# Patient Record
Sex: FEMALE | Race: OTHER | HISPANIC OR LATINO | Employment: UNEMPLOYED | URBAN - METROPOLITAN AREA
[De-identification: names, ages, dates, MRNs, and addresses within clinical notes are randomized per-mention and may not be internally consistent; named-entity substitution may affect disease eponyms.]

---

## 2022-07-12 ENCOUNTER — OFFICE VISIT (OUTPATIENT)
Dept: OBSTETRICS AND GYNECOLOGY | Facility: CLINIC | Age: 22
End: 2022-07-12
Payer: MEDICAID

## 2022-07-12 VITALS
SYSTOLIC BLOOD PRESSURE: 118 MMHG | HEIGHT: 60 IN | BODY MASS INDEX: 34.54 KG/M2 | DIASTOLIC BLOOD PRESSURE: 72 MMHG | WEIGHT: 175.94 LBS

## 2022-07-12 DIAGNOSIS — O09.32 LATE PRENATAL CARE AFFECTING PREGNANCY IN SECOND TRIMESTER: ICD-10-CM

## 2022-07-12 DIAGNOSIS — Z32.01 POSITIVE PREGNANCY TEST: ICD-10-CM

## 2022-07-12 DIAGNOSIS — O26.842 UTERINE SIZE-DATE DISCREPANCY IN SECOND TRIMESTER: ICD-10-CM

## 2022-07-12 DIAGNOSIS — N91.2 AMENORRHEA: Primary | ICD-10-CM

## 2022-07-12 PROCEDURE — 1159F MED LIST DOCD IN RCRD: CPT | Mod: CPTII,,,

## 2022-07-12 PROCEDURE — 1159F PR MEDICATION LIST DOCUMENTED IN MEDICAL RECORD: ICD-10-PCS | Mod: CPTII,,,

## 2022-07-12 PROCEDURE — 99203 PR OFFICE/OUTPT VISIT, NEW, LEVL III, 30-44 MIN: ICD-10-PCS | Mod: S$PBB,TH,,

## 2022-07-12 PROCEDURE — 99999 PR PBB SHADOW E&M-NEW PATIENT-LVL II: CPT | Mod: PBBFAC,,,

## 2022-07-12 PROCEDURE — 3008F BODY MASS INDEX DOCD: CPT | Mod: CPTII,,,

## 2022-07-12 PROCEDURE — 3074F SYST BP LT 130 MM HG: CPT | Mod: CPTII,,,

## 2022-07-12 PROCEDURE — 99202 OFFICE O/P NEW SF 15 MIN: CPT | Mod: PBBFAC,TH

## 2022-07-12 PROCEDURE — 3078F DIAST BP <80 MM HG: CPT | Mod: CPTII,,,

## 2022-07-12 PROCEDURE — 87077 CULTURE AEROBIC IDENTIFY: CPT

## 2022-07-12 PROCEDURE — 3074F PR MOST RECENT SYSTOLIC BLOOD PRESSURE < 130 MM HG: ICD-10-PCS | Mod: CPTII,,,

## 2022-07-12 PROCEDURE — 3078F PR MOST RECENT DIASTOLIC BLOOD PRESSURE < 80 MM HG: ICD-10-PCS | Mod: CPTII,,,

## 2022-07-12 PROCEDURE — 87186 SC STD MICRODIL/AGAR DIL: CPT

## 2022-07-12 PROCEDURE — 99203 OFFICE O/P NEW LOW 30 MIN: CPT | Mod: S$PBB,TH,,

## 2022-07-12 PROCEDURE — 3008F PR BODY MASS INDEX (BMI) DOCUMENTED: ICD-10-PCS | Mod: CPTII,,,

## 2022-07-12 PROCEDURE — 87088 URINE BACTERIA CULTURE: CPT

## 2022-07-12 PROCEDURE — 87086 URINE CULTURE/COLONY COUNT: CPT

## 2022-07-12 PROCEDURE — 99999 PR PBB SHADOW E&M-NEW PATIENT-LVL II: ICD-10-PCS | Mod: PBBFAC,,,

## 2022-07-12 NOTE — PROGRESS NOTES
"CHIEF COMPLAINT:   Patient presents with      Possible Pregnancy        HISTORY OF PRESENT ILLNESS  Arabella Farias 21 y.o.  presents for pregnancy risk assessment.   The patient has no complaints today.  No nausea or vomiting. No bleeding or pain.  Pregnancy was not planned, and is desired.  Partner "Tony" is involved with the pregnancy.  Here today with self and child.  Lives at home with mother and son.  Pets are not in the home. Patient is not currently working.  Denies domestic abuse.  Denies chemical/pesticide/radiation exposure.  OB history:   2020: , term, boy, no complicaitons    LMP: Patient's last menstrual period was 2022.  EDC: Estimated Date of Delivery: 22  EGA: 21w3d  FHT: 152       Health Maintenance   Topic Date Due    Hepatitis C Screening  Never done    Lipid Panel  Never done    HPV Vaccines (1 - 2-dose series) Never done    TETANUS VACCINE  Never done    Pap Smear  Never done       History reviewed. No pertinent past medical history.    History reviewed. No pertinent surgical history.    History reviewed. No pertinent family history.    Social History     Socioeconomic History    Marital status: Single   Tobacco Use    Smoking status: Never Smoker    Smokeless tobacco: Never Used   Substance and Sexual Activity    Alcohol use: Not Currently    Drug use: Not Currently    Sexual activity: Not Currently     Partners: Male       No current outpatient medications on file.     No current facility-administered medications for this visit.       Review of patient's allergies indicates:  No Known Allergies      PHYSICAL EXAM   Vitals:    22 1347   BP: 118/72        PAIN SCALE: 0-1    PHYSICAL EXAM    ROS:  GENERAL: No fever, chills, fatigability or weight loss.  CV: Denies chest pain  PULM: Denies shortness of breath or wheezing.  ABDOMEN: Appetite fine. No weight loss. Denies diarrhea, abdominal pain, hematemesis or blood in stool.  URINARY: No flank " pain, dysuria or hematuria.  REPRODUCTIVE: No abnormal vaginal bleeding.       PE:   APPEARANCE: Well nourished, well developed, in no acute distress  ABDOMEN: Soft. No tenderness or masses.     UPT +    A/P:     Amenorrhea  -     POCT urine pregnancy    Positive pregnancy test  -     HIV 1/2 Ag/Ab (4th Gen); Future; Expected date: 07/12/2022  -     RPR; Future; Expected date: 07/12/2022  -     Type & Screen; Future; Expected date: 07/12/2022  -     Rubella antibody, IgG; Future; Expected date: 07/12/2022  -     Urine culture  -     CBC auto differential; Future; Expected date: 07/12/2022  -     US OB/GYN Procedure ([facility]); Future; Expected date: 07/19/2022  -     Hepatitis Panel, Acute; Future; Expected date: 07/12/2022  -     C. trachomatis/N. gonorrhoeae by AMP DNA [place]; Urine  -     Hemoglobin Electrophoresis,Hgb A2 Lei.; Future; Expected date: 07/12/2022    Uterine size-date discrepancy in second trimester  -     US OB/GYN Procedure ([facility]); Future; Expected date: 07/19/2022    Late prenatal care affecting pregnancy in second trimester           -      Patient was counseled today on A.C.S. Pap guidelines and recommendations for yearly pelvic exams, mammograms and monthly self breast exams; to see her PCP for other health maintenance and pregnancy.   - Pap is not up to date. Pap was not done. Will do pap PP  -      Patient's medications and medical history reviewed with patient and implications in pregnancy.   Late prenatal care  -     Follow-up initial OB and u/s.   -     Initial labs today

## 2022-07-14 ENCOUNTER — TELEPHONE (OUTPATIENT)
Dept: OBSTETRICS AND GYNECOLOGY | Facility: CLINIC | Age: 22
End: 2022-07-14
Payer: MEDICAID

## 2022-07-14 LAB — BACTERIA UR CULT: ABNORMAL

## 2022-07-14 NOTE — TELEPHONE ENCOUNTER
Called lab and they said gc/ct was canceled because the quantity was insufficient and that pt will need to repeat.

## 2022-07-14 NOTE — TELEPHONE ENCOUNTER
----- Message from Cayla See sent at 7/13/2022  3:08 AM CDT -----  Regarding: Lab Client Services  Contact: [phone]  Good Morning,     My name is Cayla See I work in the Lab Client Services. We had a problem with some lab work on this patient. If someone from your office could call us at [phone] or [phone] that would be great. Anyone in my department can help.      Thank you

## 2022-07-19 ENCOUNTER — TELEPHONE (OUTPATIENT)
Dept: OBSTETRICS AND GYNECOLOGY | Facility: HOSPITAL | Age: 22
End: 2022-07-19
Payer: MEDICAID

## 2022-07-19 DIAGNOSIS — O23.40 URINARY TRACT INFECTION IN MOTHER DURING PREGNANCY, ANTEPARTUM: Primary | ICD-10-CM

## 2022-07-19 RX ORDER — NITROFURANTOIN 25; 75 MG/1; MG/1
100 CAPSULE ORAL 2 TIMES DAILY
Qty: 14 CAPSULE | Refills: 0 | Status: SHIPPED | OUTPATIENT
Start: 2022-07-19 | End: 2022-07-26

## 2022-07-21 ENCOUNTER — LAB VISIT (OUTPATIENT)
Dept: LAB | Facility: HOSPITAL | Age: 22
End: 2022-07-21
Payer: MEDICAID

## 2022-07-21 ENCOUNTER — PROCEDURE VISIT (OUTPATIENT)
Dept: OBSTETRICS AND GYNECOLOGY | Facility: CLINIC | Age: 22
End: 2022-07-21
Payer: MEDICAID

## 2022-07-21 ENCOUNTER — INITIAL PRENATAL (OUTPATIENT)
Dept: OBSTETRICS AND GYNECOLOGY | Facility: CLINIC | Age: 22
End: 2022-07-21
Payer: MEDICAID

## 2022-07-21 VITALS
DIASTOLIC BLOOD PRESSURE: 50 MMHG | SYSTOLIC BLOOD PRESSURE: 100 MMHG | WEIGHT: 174.81 LBS | BODY MASS INDEX: 34.14 KG/M2

## 2022-07-21 DIAGNOSIS — O09.32 LATE PRENATAL CARE AFFECTING PREGNANCY IN SECOND TRIMESTER: Primary | ICD-10-CM

## 2022-07-21 DIAGNOSIS — Z32.01 POSITIVE PREGNANCY TEST: ICD-10-CM

## 2022-07-21 DIAGNOSIS — Z36.2 ENCOUNTER FOR FOLLOW-UP ULTRASOUND OF FETAL ANATOMY: ICD-10-CM

## 2022-07-21 DIAGNOSIS — O26.842 UTERINE SIZE-DATE DISCREPANCY IN SECOND TRIMESTER: ICD-10-CM

## 2022-07-21 LAB
BASOPHILS # BLD AUTO: 0.03 K/UL (ref 0–0.2)
BASOPHILS NFR BLD: 0.2 % (ref 0–1.9)
DIFFERENTIAL METHOD: ABNORMAL
EOSINOPHIL # BLD AUTO: 0.2 K/UL (ref 0–0.5)
EOSINOPHIL NFR BLD: 1.6 % (ref 0–8)
ERYTHROCYTE [DISTWIDTH] IN BLOOD BY AUTOMATED COUNT: 14.9 % (ref 11.5–14.5)
HCT VFR BLD AUTO: 32 % (ref 37–48.5)
HGB BLD-MCNC: 10 G/DL (ref 12–16)
IMM GRANULOCYTES # BLD AUTO: 0.06 K/UL (ref 0–0.04)
IMM GRANULOCYTES NFR BLD AUTO: 0.5 % (ref 0–0.5)
LYMPHOCYTES # BLD AUTO: 2.4 K/UL (ref 1–4.8)
LYMPHOCYTES NFR BLD: 18.4 % (ref 18–48)
MCH RBC QN AUTO: 26.7 PG (ref 27–31)
MCHC RBC AUTO-ENTMCNC: 31.3 G/DL (ref 32–36)
MCV RBC AUTO: 85 FL (ref 82–98)
MONOCYTES # BLD AUTO: 0.6 K/UL (ref 0.3–1)
MONOCYTES NFR BLD: 4.4 % (ref 4–15)
NEUTROPHILS # BLD AUTO: 9.7 K/UL (ref 1.8–7.7)
NEUTROPHILS NFR BLD: 74.9 % (ref 38–73)
NRBC BLD-RTO: 0 /100 WBC
PLATELET # BLD AUTO: 269 K/UL (ref 150–450)
PMV BLD AUTO: 11.4 FL (ref 9.2–12.9)
RBC # BLD AUTO: 3.75 M/UL (ref 4–5.4)
WBC # BLD AUTO: 12.97 K/UL (ref 3.9–12.7)

## 2022-07-21 PROCEDURE — 36415 COLL VENOUS BLD VENIPUNCTURE: CPT

## 2022-07-21 PROCEDURE — 99999 PR PBB SHADOW E&M-EST. PATIENT-LVL III: ICD-10-PCS | Mod: PBBFAC,,, | Performed by: ADVANCED PRACTICE MIDWIFE

## 2022-07-21 PROCEDURE — 86592 SYPHILIS TEST NON-TREP QUAL: CPT

## 2022-07-21 PROCEDURE — 86850 RBC ANTIBODY SCREEN: CPT

## 2022-07-21 PROCEDURE — 87389 HIV-1 AG W/HIV-1&-2 AB AG IA: CPT

## 2022-07-21 PROCEDURE — 99999 PR PBB SHADOW E&M-EST. PATIENT-LVL III: CPT | Mod: PBBFAC,,, | Performed by: ADVANCED PRACTICE MIDWIFE

## 2022-07-21 PROCEDURE — 99213 OFFICE O/P EST LOW 20 MIN: CPT | Mod: PBBFAC,TH | Performed by: ADVANCED PRACTICE MIDWIFE

## 2022-07-21 PROCEDURE — 99213 PR OFFICE/OUTPT VISIT, EST, LEVL III, 20-29 MIN: ICD-10-PCS | Mod: TH,S$PBB,, | Performed by: ADVANCED PRACTICE MIDWIFE

## 2022-07-21 PROCEDURE — 76805 US OB/GYN PROCEDURE (VIEWPOINT): ICD-10-PCS | Mod: 26,S$PBB,, | Performed by: OBSTETRICS & GYNECOLOGY

## 2022-07-21 PROCEDURE — 80074 ACUTE HEPATITIS PANEL: CPT

## 2022-07-21 PROCEDURE — 99213 OFFICE O/P EST LOW 20 MIN: CPT | Mod: TH,S$PBB,, | Performed by: ADVANCED PRACTICE MIDWIFE

## 2022-07-21 PROCEDURE — 85025 COMPLETE CBC W/AUTO DIFF WBC: CPT

## 2022-07-21 PROCEDURE — 76805 OB US >/= 14 WKS SNGL FETUS: CPT | Mod: PBBFAC | Performed by: OBSTETRICS & GYNECOLOGY

## 2022-07-21 PROCEDURE — 83020 HEMOGLOBIN ELECTROPHORESIS: CPT

## 2022-07-21 PROCEDURE — 86762 RUBELLA ANTIBODY: CPT

## 2022-07-21 NOTE — PROGRESS NOTES
21 y.o. female  at 23w1d   Reports + FM, denies VB, LOF, or cramping  Doing well without concerns. Did not do labs after her last visit, will do them today.   TW lbs   US today shows single IUP with SANCHEZ of . Anatomy NOT complete, cephalic presentation, anterior placenta, 3VC, normal IRAIS, EFW 1#5oz. Repeat US nv.   Does not want to do genprobe today. Will do nv.   Reviewed upcoming 28wk labs, (unsure of blood type, will do labs today) and orders placed, tdap handout provided and explained  Reviewed warning signs, normal FM,  labor precautions and how/when to call.  RTC x 4 wks, call or present sooner prn.     I spent a total of 20 minutes on the day of the visit.This includes face to face time and non-face to face time preparing to see the patient (eg, review of tests), Obtaining and/or reviewing separately obtained history, Documenting clinical information in the electronic or other health record, Independently interpreting resultsand communicating results to the patient/family/caregiver, or Care coordination.

## 2022-07-22 LAB
ABO + RH BLD: NORMAL
BLD GP AB SCN CELLS X3 SERPL QL: NORMAL
HGB A2 MFR BLD HPLC: 2.7 % (ref 2.2–3.2)
HGB FRACT BLD ELPH-IMP: NORMAL
HGB FRACT BLD ELPH-IMP: NORMAL
HIV 1+2 AB+HIV1 P24 AG SERPL QL IA: NEGATIVE
RPR SER QL: NORMAL
RUBV IGG SER-ACNC: 13.7 IU/ML
RUBV IGG SER-IMP: REACTIVE

## 2022-07-25 DIAGNOSIS — R76.8 HEPATITIS C ANTIBODY TEST POSITIVE: Primary | ICD-10-CM

## 2022-07-25 LAB
HAV IGM SERPL QL IA: NEGATIVE
HBV CORE IGM SERPL QL IA: NEGATIVE
HBV SURFACE AG SERPL QL IA: NEGATIVE
HCV AB SERPL QL IA: POSITIVE

## 2022-08-18 ENCOUNTER — ROUTINE PRENATAL (OUTPATIENT)
Dept: OBSTETRICS AND GYNECOLOGY | Facility: CLINIC | Age: 22
End: 2022-08-18
Payer: MEDICAID

## 2022-08-18 ENCOUNTER — PROCEDURE VISIT (OUTPATIENT)
Dept: OBSTETRICS AND GYNECOLOGY | Facility: CLINIC | Age: 22
End: 2022-08-18
Payer: MEDICAID

## 2022-08-18 ENCOUNTER — LAB VISIT (OUTPATIENT)
Dept: LAB | Facility: HOSPITAL | Age: 22
End: 2022-08-18
Attending: ADVANCED PRACTICE MIDWIFE
Payer: MEDICAID

## 2022-08-18 VITALS
WEIGHT: 176.81 LBS | SYSTOLIC BLOOD PRESSURE: 102 MMHG | BODY MASS INDEX: 34.53 KG/M2 | DIASTOLIC BLOOD PRESSURE: 60 MMHG

## 2022-08-18 DIAGNOSIS — O09.32 LATE PRENATAL CARE AFFECTING PREGNANCY IN SECOND TRIMESTER: ICD-10-CM

## 2022-08-18 DIAGNOSIS — Z3A.27 27 WEEKS GESTATION OF PREGNANCY: ICD-10-CM

## 2022-08-18 DIAGNOSIS — R76.8 HEPATITIS C ANTIBODY POSITIVE IN BLOOD: ICD-10-CM

## 2022-08-18 DIAGNOSIS — O23.40 URINARY TRACT INFECTION IN MOTHER DURING PREGNANCY, ANTEPARTUM: ICD-10-CM

## 2022-08-18 DIAGNOSIS — Z36.2 ENCOUNTER FOR FOLLOW-UP ULTRASOUND OF FETAL ANATOMY: ICD-10-CM

## 2022-08-18 DIAGNOSIS — O09.32 LATE PRENATAL CARE AFFECTING PREGNANCY IN SECOND TRIMESTER: Primary | ICD-10-CM

## 2022-08-18 DIAGNOSIS — R76.8 HEPATITIS C ANTIBODY TEST POSITIVE: ICD-10-CM

## 2022-08-18 LAB — GLUCOSE SERPL-MCNC: 109 MG/DL (ref 70–140)

## 2022-08-18 PROCEDURE — 87591 N.GONORRHOEAE DNA AMP PROB: CPT | Performed by: MIDWIFE

## 2022-08-18 PROCEDURE — 99213 OFFICE O/P EST LOW 20 MIN: CPT | Mod: TH,S$PBB,25, | Performed by: MIDWIFE

## 2022-08-18 PROCEDURE — 99212 OFFICE O/P EST SF 10 MIN: CPT | Mod: PBBFAC,TH | Performed by: MIDWIFE

## 2022-08-18 PROCEDURE — 82950 GLUCOSE TEST: CPT | Performed by: ADVANCED PRACTICE MIDWIFE

## 2022-08-18 PROCEDURE — 36415 COLL VENOUS BLD VENIPUNCTURE: CPT | Performed by: ADVANCED PRACTICE MIDWIFE

## 2022-08-18 PROCEDURE — 87086 URINE CULTURE/COLONY COUNT: CPT | Performed by: MIDWIFE

## 2022-08-18 PROCEDURE — 76816 OB US FOLLOW-UP PER FETUS: CPT | Mod: PBBFAC | Performed by: OBSTETRICS & GYNECOLOGY

## 2022-08-18 PROCEDURE — 99213 PR OFFICE/OUTPT VISIT, EST, LEVL III, 20-29 MIN: ICD-10-PCS | Mod: TH,S$PBB,25, | Performed by: MIDWIFE

## 2022-08-18 PROCEDURE — 76816 US OB/GYN PROCEDURE (VIEWPOINT): ICD-10-PCS | Mod: 26,S$PBB,, | Performed by: OBSTETRICS & GYNECOLOGY

## 2022-08-18 PROCEDURE — 87491 CHLMYD TRACH DNA AMP PROBE: CPT | Performed by: MIDWIFE

## 2022-08-18 PROCEDURE — 99999 PR PBB SHADOW E&M-EST. PATIENT-LVL II: CPT | Mod: PBBFAC,,, | Performed by: MIDWIFE

## 2022-08-18 PROCEDURE — 99999 PR PBB SHADOW E&M-EST. PATIENT-LVL II: ICD-10-PCS | Mod: PBBFAC,,, | Performed by: MIDWIFE

## 2022-08-18 PROCEDURE — 87522 HEPATITIS C REVRS TRNSCRPJ: CPT

## 2022-08-18 NOTE — PROGRESS NOTES
21 y.o. female  at 27w1d   Reports + FM, denies VB, LOF, or cramping  Doing well without concerns,  used for visit, discussed diagnosis of positive hepatitis C antibody, pt denies IV drug use or a blood transfusion, will do more labs today to determine viral load if any, glucose screen today as well  TW lbs   TDAP info given  US today for sub opt anatomy, all anatomy normal and complete, good interval growth  Reviewed upcoming 28wk labs, (O POS) and orders placed, tdap handout provided and explained  Reviewed warning signs, normal FM,  labor precautions and how/when to call.  RTC x 2 wks, call or present sooner prn.     I spent a total of 20 minutes on the day of the visit.This includes face to face time and non-face to face time preparing to see the patient (eg, review of tests), Obtaining and/or reviewing separately obtained history, Documenting clinical information in the electronic or other health record, Independently interpreting resultsand communicating results to the patient/family/caregiver, or Care coordination.

## 2022-08-20 LAB
BACTERIA UR CULT: NO GROWTH
HCV RNA SERPL NAA+PROBE-ACNC: NORMAL IU/ML

## 2022-08-21 LAB
C TRACH DNA SPEC QL NAA+PROBE: NOT DETECTED
N GONORRHOEA DNA SPEC QL NAA+PROBE: NOT DETECTED

## 2022-09-02 ENCOUNTER — ROUTINE PRENATAL (OUTPATIENT)
Dept: OBSTETRICS AND GYNECOLOGY | Facility: CLINIC | Age: 22
End: 2022-09-02
Payer: MEDICAID

## 2022-09-02 VITALS — DIASTOLIC BLOOD PRESSURE: 72 MMHG | SYSTOLIC BLOOD PRESSURE: 124 MMHG | WEIGHT: 177 LBS | BODY MASS INDEX: 34.57 KG/M2

## 2022-09-02 DIAGNOSIS — Z23 NEED FOR TDAP VACCINATION: Primary | ICD-10-CM

## 2022-09-02 DIAGNOSIS — O99.013 ANEMIA IN PREGNANCY, THIRD TRIMESTER: ICD-10-CM

## 2022-09-02 PROCEDURE — 99999 PR PBB SHADOW E&M-EST. PATIENT-LVL I: ICD-10-PCS | Mod: PBBFAC,,, | Performed by: ADVANCED PRACTICE MIDWIFE

## 2022-09-02 PROCEDURE — 99213 OFFICE O/P EST LOW 20 MIN: CPT | Mod: TH,S$PBB,, | Performed by: ADVANCED PRACTICE MIDWIFE

## 2022-09-02 PROCEDURE — 99999 PR PBB SHADOW E&M-EST. PATIENT-LVL I: CPT | Mod: PBBFAC,,, | Performed by: ADVANCED PRACTICE MIDWIFE

## 2022-09-02 PROCEDURE — 99213 PR OFFICE/OUTPT VISIT, EST, LEVL III, 20-29 MIN: ICD-10-PCS | Mod: TH,S$PBB,, | Performed by: ADVANCED PRACTICE MIDWIFE

## 2022-09-02 PROCEDURE — 90715 TDAP VACCINE 7 YRS/> IM: CPT | Mod: PBBFAC

## 2022-09-02 PROCEDURE — 99211 OFF/OP EST MAY X REQ PHY/QHP: CPT | Mod: PBBFAC,TH | Performed by: ADVANCED PRACTICE MIDWIFE

## 2022-09-02 RX ORDER — FERROUS SULFATE 324(65)MG
324 TABLET, DELAYED RELEASE (ENTERIC COATED) ORAL DAILY
Qty: 30 TABLET | Refills: 5 | Status: SHIPPED | OUTPATIENT
Start: 2022-09-02 | End: 2022-10-18

## 2022-09-02 NOTE — PROGRESS NOTES
21 y.o. female  at 29w2d   Reports + FM, denies VB, LOF or CTX  Doing well without concerns     TW lbs   Reviewed 28wk lab results (O POS)  Anemia-sending iron RX today  Passed GTT  Tdap today    Reviewed warning signs, normal FKCs,  labor precautions and how/when to call.  RTC x 2 wks, call or present sooner prn.     I spent a total of 20 minutes on the day of the visit.This includes face to face time and non-face to face time preparing to see the patient (eg, review of tests), Obtaining and/or reviewing separately obtained history, Documenting clinical information in the electronic or other health record, Independently interpreting resultsand communicating results to the patient/family/caregiver, or Care coordination.

## 2022-09-16 ENCOUNTER — ROUTINE PRENATAL (OUTPATIENT)
Dept: OBSTETRICS AND GYNECOLOGY | Facility: CLINIC | Age: 22
End: 2022-09-16
Payer: MEDICAID

## 2022-09-16 VITALS
SYSTOLIC BLOOD PRESSURE: 118 MMHG | WEIGHT: 175.06 LBS | DIASTOLIC BLOOD PRESSURE: 70 MMHG | BODY MASS INDEX: 34.19 KG/M2

## 2022-09-16 DIAGNOSIS — O23.40 URINARY TRACT INFECTION IN MOTHER DURING PREGNANCY, ANTEPARTUM: ICD-10-CM

## 2022-09-16 DIAGNOSIS — Z3A.31 31 WEEKS GESTATION OF PREGNANCY: Primary | ICD-10-CM

## 2022-09-16 PROCEDURE — 99213 OFFICE O/P EST LOW 20 MIN: CPT | Mod: TH,S$PBB,,

## 2022-09-16 PROCEDURE — 99213 PR OFFICE/OUTPT VISIT, EST, LEVL III, 20-29 MIN: ICD-10-PCS | Mod: TH,S$PBB,,

## 2022-09-16 PROCEDURE — 99212 OFFICE O/P EST SF 10 MIN: CPT | Mod: PBBFAC,TH

## 2022-09-16 PROCEDURE — 99999 PR PBB SHADOW E&M-EST. PATIENT-LVL II: CPT | Mod: PBBFAC,,,

## 2022-09-16 PROCEDURE — 99999 PR PBB SHADOW E&M-EST. PATIENT-LVL II: ICD-10-PCS | Mod: PBBFAC,,,

## 2022-09-16 NOTE — PROGRESS NOTES
21 y.o. female  at 31w2d   Reports + FM, denies VB, LOF or CTX  Doing well without concerns   TWG: 10 lbs   Tdap 22  Reviewed warning signs, normal FKCs,  labor precautions and how/when to call. Patient states understanding.  RTC x 2 wks, call or present sooner prn.     I spent a total of 20 minutes on the day of the visit.This includes face to face time and non-face to face time preparing to see the patient (eg, review of tests), Obtaining and/or reviewing separately obtained history, Documenting clinical information in the electronic or other health record, Independently interpreting resultsand communicating results to the patient/family/caregiver, or Care coordination.

## 2022-09-21 ENCOUNTER — PATIENT MESSAGE (OUTPATIENT)
Dept: ADMINISTRATIVE | Facility: OTHER | Age: 22
End: 2022-09-21
Payer: MEDICAID

## 2022-10-03 ENCOUNTER — ROUTINE PRENATAL (OUTPATIENT)
Dept: OBSTETRICS AND GYNECOLOGY | Facility: CLINIC | Age: 22
End: 2022-10-03
Payer: MEDICAID

## 2022-10-03 VITALS
DIASTOLIC BLOOD PRESSURE: 74 MMHG | WEIGHT: 172.19 LBS | SYSTOLIC BLOOD PRESSURE: 120 MMHG | BODY MASS INDEX: 33.63 KG/M2

## 2022-10-03 DIAGNOSIS — Z36.89 ENCOUNTER FOR ULTRASOUND TO ASSESS FETAL GROWTH: Primary | ICD-10-CM

## 2022-10-03 DIAGNOSIS — O09.32 LATE PRENATAL CARE AFFECTING PREGNANCY IN SECOND TRIMESTER: ICD-10-CM

## 2022-10-03 PROCEDURE — 99212 OFFICE O/P EST SF 10 MIN: CPT | Mod: PBBFAC,TH | Performed by: ADVANCED PRACTICE MIDWIFE

## 2022-10-03 PROCEDURE — 99999 PR PBB SHADOW E&M-EST. PATIENT-LVL II: ICD-10-PCS | Mod: PBBFAC,,, | Performed by: ADVANCED PRACTICE MIDWIFE

## 2022-10-03 PROCEDURE — 99999 PR PBB SHADOW E&M-EST. PATIENT-LVL II: CPT | Mod: PBBFAC,,, | Performed by: ADVANCED PRACTICE MIDWIFE

## 2022-10-03 PROCEDURE — 99213 OFFICE O/P EST LOW 20 MIN: CPT | Mod: TH,S$PBB,, | Performed by: ADVANCED PRACTICE MIDWIFE

## 2022-10-03 PROCEDURE — 99213 PR OFFICE/OUTPT VISIT, EST, LEVL III, 20-29 MIN: ICD-10-PCS | Mod: TH,S$PBB,, | Performed by: ADVANCED PRACTICE MIDWIFE

## 2022-10-03 NOTE — PROGRESS NOTES
21 y.o. female  at 33w5d    [facility]  number [id number]  Reports + FM, denies VB, LOF or regular CTX  Doing well without concerns.  TW lbs   GBS handout provided and reviewed  Reviewed warning signs, normal FKCs, labor precautions and how/when to call.  RTC x 2 wks, call or present sooner prn.     I spent a total of 20 minutes on the day of the visit.This includes face to face time and non-face to face time preparing to see the patient (eg, review of tests), Obtaining and/or reviewing separately obtained history, Documenting clinical information in the electronic or other health record, Independently interpreting resultsand communicating results to the patient/family/caregiver, or Care coordination.

## 2022-10-18 ENCOUNTER — ROUTINE PRENATAL (OUTPATIENT)
Dept: OBSTETRICS AND GYNECOLOGY | Facility: CLINIC | Age: 22
End: 2022-10-18
Payer: MEDICAID

## 2022-10-18 ENCOUNTER — PROCEDURE VISIT (OUTPATIENT)
Dept: OBSTETRICS AND GYNECOLOGY | Facility: CLINIC | Age: 22
End: 2022-10-18
Payer: MEDICAID

## 2022-10-18 VITALS — BODY MASS INDEX: 33.54 KG/M2 | SYSTOLIC BLOOD PRESSURE: 98 MMHG | WEIGHT: 171.75 LBS | DIASTOLIC BLOOD PRESSURE: 74 MMHG

## 2022-10-18 DIAGNOSIS — Z36.89 ENCOUNTER FOR ULTRASOUND TO ASSESS FETAL GROWTH: ICD-10-CM

## 2022-10-18 DIAGNOSIS — O09.32 LATE PRENATAL CARE AFFECTING PREGNANCY IN SECOND TRIMESTER: Primary | ICD-10-CM

## 2022-10-18 PROCEDURE — 99212 OFFICE O/P EST SF 10 MIN: CPT | Mod: PBBFAC,TH | Performed by: ADVANCED PRACTICE MIDWIFE

## 2022-10-18 PROCEDURE — 76816 US OB/GYN PROCEDURE (VIEWPOINT): ICD-10-PCS | Mod: 26,S$PBB,, | Performed by: OBSTETRICS & GYNECOLOGY

## 2022-10-18 PROCEDURE — 99999 PR PBB SHADOW E&M-EST. PATIENT-LVL II: CPT | Mod: PBBFAC,,, | Performed by: ADVANCED PRACTICE MIDWIFE

## 2022-10-18 PROCEDURE — 99213 PR OFFICE/OUTPT VISIT, EST, LEVL III, 20-29 MIN: ICD-10-PCS | Mod: TH,S$PBB,, | Performed by: ADVANCED PRACTICE MIDWIFE

## 2022-10-18 PROCEDURE — 99999 PR PBB SHADOW E&M-EST. PATIENT-LVL II: ICD-10-PCS | Mod: PBBFAC,,, | Performed by: ADVANCED PRACTICE MIDWIFE

## 2022-10-18 PROCEDURE — 99213 OFFICE O/P EST LOW 20 MIN: CPT | Mod: TH,S$PBB,, | Performed by: ADVANCED PRACTICE MIDWIFE

## 2022-10-18 PROCEDURE — 76816 OB US FOLLOW-UP PER FETUS: CPT | Mod: PBBFAC | Performed by: OBSTETRICS & GYNECOLOGY

## 2022-10-18 NOTE — PROGRESS NOTES
21 y.o. female  at 35w6d  Reports + FM, denies VB, LOF or regular CTX  Doing well without concerns. Feeling more pressure over the last few days but not contractions. Encouraged to pack bags and get car seat ready.   TW lbs   GBS NV, 35.6 today.    US today shwos cephalic presentation, 3VC, anterior placenta, IRAIS 15.8, EFW 6#4oz (40%)  Reviewed warning signs, normal FKCs, labor precautions and how/when to call.  RTC x 1 wks, call or present sooner prn.     I spent a total of 20 minutes on the day of the visit.This includes face to face time and non-face to face time preparing to see the patient (eg, review of tests), Obtaining and/or reviewing separately obtained history, Documenting clinical information in the electronic or other health record, Independently interpreting resultsand communicating results to the patient/family/caregiver, or Care coordination.

## 2022-10-26 ENCOUNTER — ROUTINE PRENATAL (OUTPATIENT)
Dept: OBSTETRICS AND GYNECOLOGY | Facility: CLINIC | Age: 22
End: 2022-10-26
Payer: MEDICAID

## 2022-10-26 VITALS — SYSTOLIC BLOOD PRESSURE: 114 MMHG | BODY MASS INDEX: 33.88 KG/M2 | WEIGHT: 173.5 LBS | DIASTOLIC BLOOD PRESSURE: 70 MMHG

## 2022-10-26 DIAGNOSIS — Z3A.37 37 WEEKS GESTATION OF PREGNANCY: Primary | ICD-10-CM

## 2022-10-26 PROCEDURE — 87081 CULTURE SCREEN ONLY: CPT | Performed by: ADVANCED PRACTICE MIDWIFE

## 2022-10-26 PROCEDURE — 99213 PR OFFICE/OUTPT VISIT, EST, LEVL III, 20-29 MIN: ICD-10-PCS | Mod: TH,S$PBB,, | Performed by: ADVANCED PRACTICE MIDWIFE

## 2022-10-26 PROCEDURE — 90471 IMMUNIZATION ADMIN: CPT | Mod: PBBFAC

## 2022-10-26 PROCEDURE — 99999 PR PBB SHADOW E&M-EST. PATIENT-LVL II: ICD-10-PCS | Mod: PBBFAC,,, | Performed by: ADVANCED PRACTICE MIDWIFE

## 2022-10-26 PROCEDURE — 99212 OFFICE O/P EST SF 10 MIN: CPT | Mod: PBBFAC | Performed by: ADVANCED PRACTICE MIDWIFE

## 2022-10-26 PROCEDURE — 99213 OFFICE O/P EST LOW 20 MIN: CPT | Mod: TH,S$PBB,, | Performed by: ADVANCED PRACTICE MIDWIFE

## 2022-10-26 PROCEDURE — 99999 PR PBB SHADOW E&M-EST. PATIENT-LVL II: CPT | Mod: PBBFAC,,, | Performed by: ADVANCED PRACTICE MIDWIFE

## 2022-10-26 NOTE — PROGRESS NOTES
21 y.o. female  at 37w0d MARTTI used for interpretation   Reports + FM, denies VB, LOF or regular CTX  Doing well without concerns   TW lbs   GBS collected today   The skin of the suprapubic region was evaluated and appears intact without lesions.  Counseled the patient to shower daily and to wash this area with an antibacterial soap such as Dial daily.  Advised her to not shave the hair from this area from now until after delivery.  I also counseled the patient to place antibacterial hand soap in all her bathrooms and kitchen at home to help facilitate proper hand hygiene practices before and after delivery.   Flu Vaccine today  Written info in Tongan on how to tell when labor starts provided.  Reviewed warning signs, normal FKCs, labor precautions and how/when to call.  RTC x 1 wks, call or present sooner prn.     I spent a total of 15 minutes on the day of the visit.This includes face to face time and non-face to face time preparing to see the patient (eg, review of tests), Obtaining and/or reviewing separately obtained history, Documenting clinical information in the electronic or other health record, Independently interpreting resultsand communicating results to the patient/family/caregiver, or Care coordination.

## 2022-10-29 LAB — BACTERIA SPEC AEROBE CULT: NORMAL

## 2022-11-04 ENCOUNTER — ROUTINE PRENATAL (OUTPATIENT)
Dept: OBSTETRICS AND GYNECOLOGY | Facility: CLINIC | Age: 22
End: 2022-11-04
Payer: MEDICAID

## 2022-11-04 ENCOUNTER — PROCEDURE VISIT (OUTPATIENT)
Dept: OBSTETRICS AND GYNECOLOGY | Facility: CLINIC | Age: 22
End: 2022-11-04
Payer: MEDICAID

## 2022-11-04 VITALS
WEIGHT: 172.81 LBS | DIASTOLIC BLOOD PRESSURE: 74 MMHG | BODY MASS INDEX: 33.76 KG/M2 | SYSTOLIC BLOOD PRESSURE: 120 MMHG

## 2022-11-04 DIAGNOSIS — Z3A.38 38 WEEKS GESTATION OF PREGNANCY: ICD-10-CM

## 2022-11-04 DIAGNOSIS — O36.8190 DECREASED FETAL MOVEMENT DURING PREGNANCY, ANTEPARTUM, SINGLE OR UNSPECIFIED FETUS: Primary | ICD-10-CM

## 2022-11-04 DIAGNOSIS — O36.8190 DECREASED FETAL MOVEMENT DURING PREGNANCY, ANTEPARTUM, SINGLE OR UNSPECIFIED FETUS: ICD-10-CM

## 2022-11-04 PROCEDURE — 99213 OFFICE O/P EST LOW 20 MIN: CPT | Mod: TH,S$PBB,, | Performed by: ADVANCED PRACTICE MIDWIFE

## 2022-11-04 PROCEDURE — 76819 US OB/GYN PROCEDURE (VIEWPOINT): ICD-10-PCS | Mod: 26,S$PBB,, | Performed by: OBSTETRICS & GYNECOLOGY

## 2022-11-04 PROCEDURE — 76819 FETAL BIOPHYS PROFIL W/O NST: CPT | Mod: PBBFAC | Performed by: OBSTETRICS & GYNECOLOGY

## 2022-11-04 PROCEDURE — 99213 PR OFFICE/OUTPT VISIT, EST, LEVL III, 20-29 MIN: ICD-10-PCS | Mod: TH,S$PBB,, | Performed by: ADVANCED PRACTICE MIDWIFE

## 2022-11-04 PROCEDURE — 99999 PR PBB SHADOW E&M-EST. PATIENT-LVL II: ICD-10-PCS | Mod: PBBFAC,,, | Performed by: ADVANCED PRACTICE MIDWIFE

## 2022-11-04 PROCEDURE — 99212 OFFICE O/P EST SF 10 MIN: CPT | Mod: PBBFAC,TH,25 | Performed by: ADVANCED PRACTICE MIDWIFE

## 2022-11-04 PROCEDURE — 99999 PR PBB SHADOW E&M-EST. PATIENT-LVL II: CPT | Mod: PBBFAC,,, | Performed by: ADVANCED PRACTICE MIDWIFE

## 2022-11-04 NOTE — PROGRESS NOTES
JAIRO used for interpretation.  C/O decreased FM, normal FKC's reviewed.  BPP 8 of 8, VTX , MVP 6.0   Advised GBS negative.  S/S labor reviewed.  Warning S/S reviewed.  LND contact # provided.  RTC 1 week    Today I spent a total of 20 minutes with this patient. This includes face to face time and non-face to face time preparing to see the patient (eg, review of tests), obtaining and/or reviewing separately obtained history, documenting clinical information in the electronic or other health record, independently interpreting results and communicating results to the patient/family/caregiver, or care coordinator.

## 2022-11-05 ENCOUNTER — HOSPITAL ENCOUNTER (INPATIENT)
Facility: HOSPITAL | Age: 22
LOS: 2 days | Discharge: HOME | End: 2022-11-07
Attending: OBSTETRICS & GYNECOLOGY | Admitting: OBSTETRICS & GYNECOLOGY
Payer: MEDICAID

## 2022-11-05 DIAGNOSIS — Z37.9 NORMAL LABOR: ICD-10-CM

## 2022-11-05 PROBLEM — O09.32 LATE PRENATAL CARE AFFECTING PREGNANCY IN SECOND TRIMESTER: Status: RESOLVED | Noted: 2022-07-12 | Resolved: 2022-11-05

## 2022-11-05 PROBLEM — Z78.9 USES SPANISH AS PRIMARY SPOKEN LANGUAGE: Status: ACTIVE | Noted: 2022-11-05

## 2022-11-05 PROBLEM — Z3A.38 38 WEEKS GESTATION OF PREGNANCY: Status: RESOLVED | Noted: 2022-11-04 | Resolved: 2022-11-05

## 2022-11-05 PROBLEM — O23.40 URINARY TRACT INFECTION IN MOTHER DURING PREGNANCY, ANTEPARTUM: Status: RESOLVED | Noted: 2022-07-19 | Resolved: 2022-11-05

## 2022-11-05 PROBLEM — D64.9 ANEMIA: Status: ACTIVE | Noted: 2022-11-05

## 2022-11-05 LAB
ABO + RH BLD: NORMAL
BASOPHILS # BLD AUTO: 0.04 K/UL (ref 0–0.2)
BASOPHILS NFR BLD: 0.3 % (ref 0–1.9)
BLD GP AB SCN CELLS X3 SERPL QL: NORMAL
DIFFERENTIAL METHOD: ABNORMAL
EOSINOPHIL # BLD AUTO: 0.1 K/UL (ref 0–0.5)
EOSINOPHIL NFR BLD: 0.7 % (ref 0–8)
ERYTHROCYTE [DISTWIDTH] IN BLOOD BY AUTOMATED COUNT: 15.2 % (ref 11.5–14.5)
HCT VFR BLD AUTO: 33.6 % (ref 37–48.5)
HGB BLD-MCNC: 10.3 G/DL (ref 12–16)
IMM GRANULOCYTES # BLD AUTO: 0.05 K/UL (ref 0–0.04)
IMM GRANULOCYTES NFR BLD AUTO: 0.4 % (ref 0–0.5)
LYMPHOCYTES # BLD AUTO: 3 K/UL (ref 1–4.8)
LYMPHOCYTES NFR BLD: 22.9 % (ref 18–48)
MCH RBC QN AUTO: 23.4 PG (ref 27–31)
MCHC RBC AUTO-ENTMCNC: 30.7 G/DL (ref 32–36)
MCV RBC AUTO: 76 FL (ref 82–98)
MONOCYTES # BLD AUTO: 0.5 K/UL (ref 0.3–1)
MONOCYTES NFR BLD: 3.7 % (ref 4–15)
NEUTROPHILS # BLD AUTO: 9.5 K/UL (ref 1.8–7.7)
NEUTROPHILS NFR BLD: 72 % (ref 38–73)
NRBC BLD-RTO: 0 /100 WBC
PLATELET # BLD AUTO: 271 K/UL (ref 150–450)
PMV BLD AUTO: 11.4 FL (ref 9.2–12.9)
RBC # BLD AUTO: 4.41 M/UL (ref 4–5.4)
RPR SER QL: NORMAL
WBC # BLD AUTO: 13.17 K/UL (ref 3.9–12.7)

## 2022-11-05 PROCEDURE — 36415 COLL VENOUS BLD VENIPUNCTURE: CPT | Performed by: MIDWIFE

## 2022-11-05 PROCEDURE — 86850 RBC ANTIBODY SCREEN: CPT | Performed by: MIDWIFE

## 2022-11-05 PROCEDURE — 72100002 HC LABOR CARE, 1ST 8 HOURS

## 2022-11-05 PROCEDURE — 59409 OBSTETRICAL CARE: CPT | Mod: AT,,, | Performed by: MIDWIFE

## 2022-11-05 PROCEDURE — 72200004 HC VAGINAL DELIVERY LEVEL I

## 2022-11-05 PROCEDURE — 11000001 HC ACUTE MED/SURG PRIVATE ROOM

## 2022-11-05 PROCEDURE — 86592 SYPHILIS TEST NON-TREP QUAL: CPT | Performed by: MIDWIFE

## 2022-11-05 PROCEDURE — 59409 PR OBSTETRICAL CARE,VAG DELIV ONLY: ICD-10-PCS | Mod: AT,,, | Performed by: MIDWIFE

## 2022-11-05 PROCEDURE — 25000003 PHARM REV CODE 250: Performed by: MIDWIFE

## 2022-11-05 PROCEDURE — 87522 HEPATITIS C REVRS TRNSCRPJ: CPT | Performed by: MIDWIFE

## 2022-11-05 PROCEDURE — 63600175 PHARM REV CODE 636 W HCPCS: Performed by: MIDWIFE

## 2022-11-05 PROCEDURE — 85025 COMPLETE CBC W/AUTO DIFF WBC: CPT | Performed by: MIDWIFE

## 2022-11-05 RX ORDER — ONDANSETRON 8 MG/1
8 TABLET, ORALLY DISINTEGRATING ORAL EVERY 8 HOURS PRN
Status: DISCONTINUED | OUTPATIENT
Start: 2022-11-05 | End: 2022-11-05

## 2022-11-05 RX ORDER — DIPHENHYDRAMINE HYDROCHLORIDE 50 MG/ML
25 INJECTION INTRAMUSCULAR; INTRAVENOUS EVERY 4 HOURS PRN
Status: DISCONTINUED | OUTPATIENT
Start: 2022-11-05 | End: 2022-11-07 | Stop reason: HOSPADM

## 2022-11-05 RX ORDER — ACETAMINOPHEN 325 MG/1
650 TABLET ORAL EVERY 6 HOURS PRN
Status: DISCONTINUED | OUTPATIENT
Start: 2022-11-05 | End: 2022-11-07 | Stop reason: HOSPADM

## 2022-11-05 RX ORDER — HYDROCORTISONE 25 MG/G
CREAM TOPICAL 3 TIMES DAILY PRN
Status: DISCONTINUED | OUTPATIENT
Start: 2022-11-05 | End: 2022-11-07 | Stop reason: HOSPADM

## 2022-11-05 RX ORDER — SODIUM CHLORIDE 0.9 % (FLUSH) 0.9 %
10 SYRINGE (ML) INJECTION
Status: DISCONTINUED | OUTPATIENT
Start: 2022-11-05 | End: 2022-11-07 | Stop reason: HOSPADM

## 2022-11-05 RX ORDER — HYDROCODONE BITARTRATE AND ACETAMINOPHEN 5; 325 MG/1; MG/1
1 TABLET ORAL EVERY 4 HOURS PRN
Status: DISCONTINUED | OUTPATIENT
Start: 2022-11-05 | End: 2022-11-07 | Stop reason: HOSPADM

## 2022-11-05 RX ORDER — DIPHENHYDRAMINE HCL 25 MG
25 CAPSULE ORAL EVERY 4 HOURS PRN
Status: DISCONTINUED | OUTPATIENT
Start: 2022-11-05 | End: 2022-11-07 | Stop reason: HOSPADM

## 2022-11-05 RX ORDER — PROCHLORPERAZINE EDISYLATE 5 MG/ML
5 INJECTION INTRAMUSCULAR; INTRAVENOUS EVERY 6 HOURS PRN
Status: DISCONTINUED | OUTPATIENT
Start: 2022-11-05 | End: 2022-11-07 | Stop reason: HOSPADM

## 2022-11-05 RX ORDER — DOCUSATE SODIUM 100 MG/1
200 CAPSULE, LIQUID FILLED ORAL 2 TIMES DAILY PRN
Status: DISCONTINUED | OUTPATIENT
Start: 2022-11-05 | End: 2022-11-07 | Stop reason: HOSPADM

## 2022-11-05 RX ORDER — LIDOCAINE HYDROCHLORIDE 10 MG/ML
10 INJECTION, SOLUTION EPIDURAL; INFILTRATION; INTRACAUDAL; PERINEURAL ONCE AS NEEDED
Status: DISCONTINUED | OUTPATIENT
Start: 2022-11-05 | End: 2022-11-05

## 2022-11-05 RX ORDER — TRANEXAMIC ACID 100 MG/ML
1000 INJECTION, SOLUTION INTRAVENOUS ONCE AS NEEDED
Status: DISCONTINUED | OUTPATIENT
Start: 2022-11-05 | End: 2022-11-05

## 2022-11-05 RX ORDER — SODIUM CHLORIDE, SODIUM LACTATE, POTASSIUM CHLORIDE, CALCIUM CHLORIDE 600; 310; 30; 20 MG/100ML; MG/100ML; MG/100ML; MG/100ML
INJECTION, SOLUTION INTRAVENOUS CONTINUOUS
Status: DISCONTINUED | OUTPATIENT
Start: 2022-11-05 | End: 2022-11-05

## 2022-11-05 RX ORDER — ONDANSETRON 8 MG/1
8 TABLET, ORALLY DISINTEGRATING ORAL EVERY 8 HOURS PRN
Status: DISCONTINUED | OUTPATIENT
Start: 2022-11-05 | End: 2022-11-07 | Stop reason: HOSPADM

## 2022-11-05 RX ORDER — SIMETHICONE 80 MG
1 TABLET,CHEWABLE ORAL 4 TIMES DAILY PRN
Status: DISCONTINUED | OUTPATIENT
Start: 2022-11-05 | End: 2022-11-05

## 2022-11-05 RX ORDER — OXYTOCIN/RINGER'S LACTATE 30/500 ML
41.7 PLASTIC BAG, INJECTION (ML) INTRAVENOUS CONTINUOUS
Status: DISCONTINUED | OUTPATIENT
Start: 2022-11-05 | End: 2022-11-05

## 2022-11-05 RX ORDER — MISOPROSTOL 200 UG/1
600 TABLET ORAL
Status: DISCONTINUED | OUTPATIENT
Start: 2022-11-05 | End: 2022-11-05

## 2022-11-05 RX ORDER — OXYTOCIN/RINGER'S LACTATE 30/500 ML
334 PLASTIC BAG, INJECTION (ML) INTRAVENOUS ONCE
Status: COMPLETED | OUTPATIENT
Start: 2022-11-05 | End: 2022-11-05

## 2022-11-05 RX ORDER — OXYTOCIN/RINGER'S LACTATE 30/500 ML
95 PLASTIC BAG, INJECTION (ML) INTRAVENOUS ONCE
Status: DISCONTINUED | OUTPATIENT
Start: 2022-11-05 | End: 2022-11-07 | Stop reason: HOSPADM

## 2022-11-05 RX ORDER — IBUPROFEN 600 MG/1
600 TABLET ORAL EVERY 6 HOURS
Status: DISCONTINUED | OUTPATIENT
Start: 2022-11-05 | End: 2022-11-07 | Stop reason: HOSPADM

## 2022-11-05 RX ORDER — PROCHLORPERAZINE EDISYLATE 5 MG/ML
5 INJECTION INTRAMUSCULAR; INTRAVENOUS EVERY 6 HOURS PRN
Status: DISCONTINUED | OUTPATIENT
Start: 2022-11-05 | End: 2022-11-05

## 2022-11-05 RX ORDER — CALCIUM CARBONATE 200(500)MG
500 TABLET,CHEWABLE ORAL 3 TIMES DAILY PRN
Status: DISCONTINUED | OUTPATIENT
Start: 2022-11-05 | End: 2022-11-05

## 2022-11-05 RX ORDER — SODIUM CHLORIDE 9 MG/ML
INJECTION, SOLUTION INTRAVENOUS
Status: DISCONTINUED | OUTPATIENT
Start: 2022-11-05 | End: 2022-11-05

## 2022-11-05 RX ADMIN — IBUPROFEN 600 MG: 600 TABLET, FILM COATED ORAL at 05:11

## 2022-11-05 RX ADMIN — Medication 334 MILLI-UNITS/MIN: at 02:11

## 2022-11-05 NOTE — L&D DELIVERY NOTE
[facility] - Labor & Delivery  Vaginal Delivery   Operative Note    SUMMARY     Normal spontaneous vaginal delivery of live infant, was placed on mothers abdomen for skin to skin and bulb suctioning performed.  Infant delivered position OA over perineum.  Nuchal cord: No. Cord draped over shoulders.     Spontaneous delivery of placenta and IV pitocin given noting good uterine tone.  Small 1st degree laceration noted, hemostatic. No repair needed.   Patient tolerated delivery well. Sponge needle and lap counted correctly x2.    LIZZETH ALSTONM/LEANNE Cyr SNM    Indications:  (spontaneous vaginal delivery)  Pregnancy complicated by:   Patient Active Problem List   Diagnosis    Hepatitis C antibody positive in blood    Uses English as primary spoken language    Anemia     (spontaneous vaginal delivery)    Single live birth     Admitting GA: 38w3d    Delivery Information for Matti Farias    Birth information:  YOB: 2023   Time of birth: 2:16 PM   Sex: male   Head Delivery Date/Time: 2022  2:16 PM   Delivery type:    Gestational Age: 38w3d    Delivery Providers    Delivering clinician: Yaneli Hyde CNM   Provider Role    Carolina Cyr, RN Nursing Student              Measurements    Weight:   Length:          Apgars    Living status:   Apgars:  1 min.:  5 min.:  10 min.:  15 min.:  20 min.:    Skin color:         Heart rate:         Reflex irritability:         Muscle tone:         Respiratory effort:         Total:                  Operative Delivery    Forceps attempted?: No  Vacuum extractor attempted?: No         Shoulder Dystocia    Shoulder dystocia present?: No           Presentation    Presentation: Vertex  Position: Middle Occiput Anterior           Interventions/Resuscitation           Cord    Vessels: 3 vessels  Complications: Body  Cord Around: shoulders  Delayed Cord Clamping?: Yes  Cord Blood Disposition: Lab  Gases Sent?: No  Stem Cell Collection (by MD): No        Placenta    Placenta delivery date/time: 2022 1426  Placenta removal: Spontaneous  Placenta appearance: Intact  Placenta disposition: discarded           Labor Events:       labor: No     Labor Onset Date/Time:         Dilation Complete Date/Time:         Start Pushing Date/Time:         Start Pushing Date/Time:       Rupture Date/Time:            Rupture type:            Fluid Amount:         Fluid Color:         Fluid Odor:         Membrane Status:                  steroids:       Antibiotics given for GBS:       Induction:       Indications for induction:        Augmentation:       Indications for augmentation:       Labor complications: None     Additional complications:          Cervical ripening:                     Delivery:      Episiotomy:       Indication for Episiotomy:       Perineal Lacerations: 1st Repaired:  No   Periurethral Laceration:   Repaired:     Labial Laceration:   Repaired:     Sulcus Laceration:   Repaired:     Vaginal Laceration:   Repaired:     Cervical Laceration:   Repaired:     Repair suture:       Repair # of packets:       Last Value - EBL - Nursing (mL):       Sum - EBL - Nursing (mL): 0     Last Value - EBL - Anesthesia (mL):        Calculated QBL (mL):         Vaginal Sweep Performed: No     Surgicount Correct: Yes       Other providers:       Anesthesia    Method: None          Details (if applicable):  Trial of Labor      Categorization:      Priority:     Indications for :     Incision Type:       Additional  information:  Forceps:    Vacuum:    Breech:    Observed anomalies    Other (Comments):

## 2022-11-05 NOTE — H&P
[facility] - Labor & Delivery  Obstetrics  History & Physical    Patient Name: Arabella Farias  MRN: [id number]  Admission Date: 2022  Primary Care Provider: Primary Doctor No    Subjective:     Principal Problem:Normal labor    History of Present Illness:   38w3d here with c/o contractions      Obstetric HPI:  Unable to complete HPI d/t imminent delivery. Pt reports strong contractions and urge to push on admit.     This pregnancy has been complicated by:  Hep C with negative quant  UTI  Anemia  Late prenatal care    OB History    Para Term  AB Living   2 1 1 0 0 1   SAB IAB Ectopic Multiple Live Births   0 0 0 0 1      # Outcome Date GA Lbr John/2nd Weight Sex Delivery Anes PTL Lv   2 Current            1 Term      Vag-Spont   CROW     No past medical history on file.  No past surgical history on file.    PTA Medications   Medication Sig    prenatal vit103-iron fum-folic () 27 mg iron- 1 mg Tab Take 1 tablet by mouth once daily.       Review of patient's allergies indicates:  No Known Allergies     Family History    None       Tobacco Use    Smoking status: Never    Smokeless tobacco: Never   Substance and Sexual Activity    Alcohol use: Not Currently    Drug use: Not Currently    Sexual activity: Not Currently     Partners: Male     Review of Systems   Gastrointestinal:  Positive for abdominal pain.   All other systems reviewed and are negative.   Objective:     Vital Signs (Most Recent):    Vital Signs (24h Range):           There is no height or weight on file to calculate BMI.    FHT: Cat 2 (reassuring) variables with quick return to baseline  TOCO:  Q 2-3 minutes    Physical Exam:   Constitutional: She is oriented to person, place, and time. She appears well-developed and well-nourished.    HENT:   Head: Normocephalic and atraumatic.    Eyes: Conjunctivae and EOM are normal.     Cardiovascular:  Normal rate.             Pulmonary/Chest: Effort normal. No respiratory  distress.        Abdominal: Soft. She exhibits no distension. There is no abdominal tenderness.     Genitourinary:    Vagina and uterus normal.             Musculoskeletal: Normal range of motion and moves all extremeties.       Neurological: She is alert and oriented to person, place, and time.    Skin: Skin is warm and dry.    Psychiatric: She has a normal mood and affect. Her behavior is normal. Judgment and thought content normal.     Cervix:  Dilation:  Ant lip, SROM clear fluid @ 1408     Significant Labs:  Lab Results   Component Value Date    GROUPTRH O POS 2022    HEPBSAG Negative 2022    STREPBCULT No Group B Streptococcus isolated 10/26/2022       I have personallly reviewed all pertinent lab results from the last 24 hours.    Assessment/Plan:     21 y.o. female  at 38w3d for:    * Normal labor  Anterior lip. SROM @ 1408. Prepare for delivery. Desires NCB.     Anemia  CBC on admit    Uses Azerbaijani as primary spoken language  Deana     Hepatitis C antibody positive in blood  Quant ordered    AMonica Hyde CNM/LEANNE Hyde CNM  Obstetrics  [facility] - Labor & Delivery

## 2022-11-05 NOTE — HOSPITAL COURSE
Anterior lip. SROM @ 1408. Prepare for delivery. Desires NCB.   11/6/22 JAIRO used for interpretation     11/7/22 PPD 2:   Visit completed with video  Lisa Lisa #[id number]  Doing well. No complaints  Discharge and home self care instructions discussed.

## 2022-11-05 NOTE — SUBJECTIVE & OBJECTIVE
Obstetric HPI:  Unable to complete HPI d/t imminent delivery. Pt reports strong contractions and urge to push on admit.     This pregnancy has been complicated by:  Hep C with negative quant  UTI  Anemia  Late prenatal care    OB History    Para Term  AB Living   2 1 1 0 0 1   SAB IAB Ectopic Multiple Live Births   0 0 0 0 1      # Outcome Date GA Lbr John/2nd Weight Sex Delivery Anes PTL Lv   2 Current            1 Term      Vag-Spont   CROW     No past medical history on file.  No past surgical history on file.    PTA Medications   Medication Sig    prenatal vit103-iron fum-folic () 27 mg iron- 1 mg Tab Take 1 tablet by mouth once daily.       Review of patient's allergies indicates:  No Known Allergies     Family History    None       Tobacco Use    Smoking status: Never    Smokeless tobacco: Never   Substance and Sexual Activity    Alcohol use: Not Currently    Drug use: Not Currently    Sexual activity: Not Currently     Partners: Male     Review of Systems   Gastrointestinal:  Positive for abdominal pain.   All other systems reviewed and are negative.   Objective:     Vital Signs (Most Recent):    Vital Signs (24h Range):           There is no height or weight on file to calculate BMI.    FHT: Cat 2 (reassuring) variables with quick return to baseline  TOCO:  Q 2-3 minutes    Physical Exam:   Constitutional: She is oriented to person, place, and time. She appears well-developed and well-nourished.    HENT:   Head: Normocephalic and atraumatic.    Eyes: Conjunctivae and EOM are normal.     Cardiovascular:  Normal rate.             Pulmonary/Chest: Effort normal. No respiratory distress.        Abdominal: Soft. She exhibits no distension. There is no abdominal tenderness.     Genitourinary:    Vagina and uterus normal.             Musculoskeletal: Normal range of motion and moves all extremeties.       Neurological: She is alert and oriented to person, place, and time.    Skin: Skin is warm  and dry.    Psychiatric: She has a normal mood and affect. Her behavior is normal. Judgment and thought content normal.     Cervix:  Dilation:  Ant lip, SROM clear fluid @ 1408     Significant Labs:  Lab Results   Component Value Date    GROUPTRH O POS 07/21/2022    HEPBSAG Negative 07/21/2022    STREPBCULT No Group B Streptococcus isolated 10/26/2022       I have personallly reviewed all pertinent lab results from the last 24 hours.

## 2022-11-05 NOTE — PLAN OF CARE
Problem: Adult Inpatient Plan of Care  Goal: Plan of Care Review  Outcome: Ongoing, Progressing  Goal: Patient-Specific Goal (Individualized)  Outcome: Ongoing, Progressing  Goal: Absence of Hospital-Acquired Illness or Injury  Outcome: Ongoing, Progressing  Goal: Optimal Comfort and Wellbeing  Outcome: Ongoing, Progressing  Goal: Readiness for Transition of Care  Outcome: Ongoing, Progressing     Problem:  Fall Injury Risk  Goal: Absence of Fall, Infant Drop and Related Injury  Outcome: Ongoing, Progressing     Problem: Adjustment to Role Transition (Postpartum Vaginal Delivery)  Goal: Successful Maternal Role Transition  Outcome: Ongoing, Progressing     Problem: Bleeding (Postpartum Vaginal Delivery)  Goal: Hemostasis  Outcome: Ongoing, Progressing     Problem: Infection (Postpartum Vaginal Delivery)  Goal: Absence of Infection Signs/Symptoms  Outcome: Ongoing, Progressing     Problem: Pain (Postpartum Vaginal Delivery)  Goal: Acceptable Pain Control  Outcome: Ongoing, Progressing     Problem: Urinary Retention (Postpartum Vaginal Delivery)  Goal: Effective Urinary Elimination  Outcome: Ongoing, Progressing

## 2022-11-05 NOTE — LACTATION NOTE
Mother states that her intentions are to both  breast and formula feed infant but she does not plan to give infant formula until she has been discharged home. Mother states that she did not feed last infant with the breast and her goal with this infant is to used breast milk for 6 months.     Mother reports infant is sleepy at the breast and not latching well. Infant asleep and uninterested in the breast or feeding at this time. Discussed hand expression and pumping at 24 hours, if infant is not latching at this time, to protect and promote milk supply and to feed baby. Discussed waking techniques and encouraged frequent skin to skin. Mother encouraged to call for assistance with feedings and to notify the nurse if infant has not feed for 3 hours. Collected 4ml of colostrum via hand expression and fed to infant with syringe. Infant tolerated well. Mother verbalized not having breast pump at home and would like to obtain one via insurance. Order request sent to LIZZETH Hyde CNM.     Lactation packet reviewed for days 1-2.  Discussed early feeding cues and encouraged mother to feed baby in response to those cues. Encouraged on demand feedings and skin to skin.  Reviewed normal feeding expectations of 8 or more feedings per 24 hour period, cues that babies use to signal hunger and satiety and cluster feeding. Discussed the adequacy of colostrum and baby belly size for the first 3 days of life along with expected output.     Discussed risks of introducing a pacifier or artificial nipple and discussed the AAP recommendation to avoid the use of pacifiers until 1 month of age for breastfeeding infants.     Mother states understanding and verbalized appropriate recall. Encouraged mother to call for assistance when desired or when infant is showing signs of hunger, contact number provided, mother verbalizes understanding.

## 2022-11-06 PROCEDURE — 11000001 HC ACUTE MED/SURG PRIVATE ROOM

## 2022-11-06 PROCEDURE — 25000003 PHARM REV CODE 250: Performed by: MIDWIFE

## 2022-11-06 PROCEDURE — 99231 PR SUBSEQUENT HOSPITAL CARE,LEVL I: ICD-10-PCS | Mod: ,,, | Performed by: ADVANCED PRACTICE MIDWIFE

## 2022-11-06 PROCEDURE — 99231 SBSQ HOSP IP/OBS SF/LOW 25: CPT | Mod: ,,, | Performed by: ADVANCED PRACTICE MIDWIFE

## 2022-11-06 RX ADMIN — IBUPROFEN 600 MG: 600 TABLET, FILM COATED ORAL at 06:11

## 2022-11-06 RX ADMIN — IBUPROFEN 600 MG: 600 TABLET, FILM COATED ORAL at 12:11

## 2022-11-06 NOTE — LACTATION NOTE
Lactation Rounds:  Katarzyna Yoruba Language Line # [id number] used for this encounter.     Mother states that breastfeeding is going well. Infant last ate at 8:10 pm. Mother states that infant did not seem interested and pulled away from the breast. Infant has voided and stooled and is sleeping comfortably at this time.     Reviewed expected  behaviors, feeding patterns and output for the first 48 hours of life. Discussed the importance of cue based feedings on demand, unrestricted access to the breast, and frequent uninterrupted skin to skin contact. Risk and implications of artificial nipples and non medically indicated formula supplementation discussed.     Mother does not have a breast pump for home use, she is interested to get a breast pump form her insurance provider. Mother informed that Lactation will assist her with ordering a breast pump.     Lactation availability provided and offered latching assistance as needed. Mother denies any lactation needs or concerns at this time. Encouraged mother to call for assistance when desired or when infant is showing signs of hunger. Mother verbalizes understanding of all education and counseling.

## 2022-11-06 NOTE — LACTATION NOTE
"This note was copied from a baby's chart.  Lactation Rounding:  services used. ID number:[id number]    Infant voiding and stooling WNL. Mother states that she has placed infant to breast exclusively since the one syringe fed the nurse assisted with after delivery. Mother states that infant has fed at the breast for 3-5min each session. Mother says that has no discomfort with feeding and that she has heard the infant swallow. Discussed waking techniques to achieve a wider gape to assist in a deeper latch. Discussed the importance of a deep latch, deep latch technique and referred mother to the [employer] video "attaching your baby to the breast." Assistance offered with next feeding for latching technique.      Upon entering room, nurse finds infant sleeping on pillow in mothers lap and mother awake sitting up in bed. Discussed mechanism of milk production and maintenance. Encouraged frequent feeds based on early cues, unrestricted access to the breast and frequent skin to skin contact. Discussed expected feeding and output pattern for day of life 2. Reinforced normalcy and importance of cluster feeding.     Nurse assessed mother's nipples. Mother's nipples appear WNL. No cracking, bleeding, redness,or blisters noted. Nipple care and hand expression reviewed. Mother signed LDH Form and fax sent to [employer].     Mother verbalized understanding of all teaching and counseling at this time. Opportunity for questions given to mom. Mother verbalized no concerns at this time. Lactation contact information given to mother. Nurse instructed mother to call for assistance if need arises.    "

## 2022-11-06 NOTE — NURSING
"Language line used at 830 pm to communicate with patient. Spoke with Inez, the interpretor. No family at the bedside. Denies any pain at this time. States she plans to breast fed baby and hand express milk as needed. Asks if she has any concerns at present, patient states "no". States she will call if needed.   "

## 2022-11-07 VITALS
TEMPERATURE: 97 F | HEART RATE: 73 BPM | RESPIRATION RATE: 18 BRPM | OXYGEN SATURATION: 97 % | SYSTOLIC BLOOD PRESSURE: 114 MMHG | DIASTOLIC BLOOD PRESSURE: 71 MMHG

## 2022-11-07 PROCEDURE — 99238 PR HOSPITAL DISCHARGE DAY,<30 MIN: ICD-10-PCS | Mod: ,,, | Performed by: MIDWIFE

## 2022-11-07 PROCEDURE — 99238 HOSP IP/OBS DSCHRG MGMT 30/<: CPT | Mod: ,,, | Performed by: MIDWIFE

## 2022-11-07 RX ORDER — IBUPROFEN 600 MG/1
600 TABLET ORAL EVERY 6 HOURS PRN
Qty: 30 TABLET | Refills: 0 | Status: SHIPPED | OUTPATIENT
Start: 2022-11-07

## 2022-11-07 NOTE — NURSING
Language line used during the night to communicate with Mom about baby having blood tests done, parents verbalized understanding. Patient afebrile and had no falls this shift. Fundus firm without massage and below umbilicus. Bleeding light, no clots passed this shift. Voids spontaneously. Ambulates independently. Pain well controlled with oral pain medication. Vital signs stable at this time. Bonding well with infant; responds to infant cues and participates in infant care. Will continue to monitor.

## 2022-11-07 NOTE — LACTATION NOTE
services used: ID number [id number]     Lactation Rounding: Mother reports that baby averages about 10 to 15 min at the breast each feeding and that the baby's feeding frequency and output is wnl.     Mother anticipates discharge home today. Reviewed signs of good attachment. Reviewed breast massage and compression during feedings and indications for use. Reviewed signs of effective milk transfer and instructed to call pediatrician and lactation if signs not present. Discussed expected feeding and output pattern for days of life 2, 3, 4, & 5+; mother instructed to call pediatrician and lactation if infant is not meeting feeding and output goals.     Reviewed signs of engorgement and expectant management. Reviewed signs of mastitis and instructed mother to call OB provider and lactation if any signs present. Discussed proper use of First Alert Form. Reviewed proper milk handling, collection and storage guidelines. Reviewed nursing diet and nutrition. Discussed resources for medication safety while breastfeeding. Reviewed available outpatient lactation resources.     Mother verbalizes understanding of all education and counseling; she denies any further lactation needs or concerns at this time. Encouraged mother to contact lactation with any questions, concerns, or problems, contact number provided.

## 2022-11-07 NOTE — DISCHARGE INSTRUCTIONS
"Autocuidado de la madre:    Actividad: Evite el ejercicio extenuante y descanse lo suficiente. No conducir hasta que se dé el consentimiento del médico.  Cambios emocionales: La mayoría de las mujeres encuentran que el nacimiento es un momento de gran agitación emocional. La sensación de pérdida, los cambios de humor, la fatiga, la ansiedad y la sensación de "decepción" son comunes. Si los sentimientos empeoran o prajapati más de farzaneh semana, llame a joseph médico.  Cuidado de los senos/Lactancia materna: Use un sostén para mayor comodidad. Mantenga los pezones secos y aplique joseph propia leche materna o crema de lanolina según sea necesario para el dolor. La congestión se puede aliviar con calor tibio y húmedo antes de las janette. También puede lacy ibuprofeno.  Cuidado de los senos/alimentación con biberón: use un sostén de soporte las 24 horas del día rancho farzaneh semana. Evite la estimulación de los senos. Puede usar compresas de hielo para las molestias.  Keisha-Cuidado/Sangrado vaginal: Recuerde usar joseph keisha-botella después de orinar. Joseph flujo cambiará de color wall, carolyn y amarillo/regalado rancho un período de 2 semanas. La menstruación regresará en 3 a 8 semanas, o más si está amamantando.  Parto vaginal por episiotomía: los puntos se disolverán entre 10 días y 3 semanas. Los jak tibios, los pliegues y el dermoplast promoverán la curación. Evite los jak de burbujas o los jabones nestor.  Cesárea/ligadura de trompas: Mantenga la incisión limpia y seca. Puede ducharse, bereket evite los jak.  Actividad sexual/[place]arral pélvico: No se permite actividad sexual, tampones ni duchas vaginales hasta que joseph médico le dé joseph consentimiento.  Dieta: Continúe comiendo de los yves grupos de alimentos básicos, incluidas muchas proteínas, frutas, verduras y granos integrales. Limite las calorías vacías y los alimentos ricos en grasas. Paola suficientes líquidos para satisfacer la sed y agregue 500 calorías adicionales para " amamantar.  Estreñimiento/Hemorroides: Paola mucha agua. Puede lacy un ablandador de heces o un laxante natural (Metamucil). Puede usar pliegues o ungüento para hemorroides y sumergirse en farzaneh mary tibia.    LLAME A PATTERSON DOCTOR OB SI OCURRE ALGUNO DE LOS SIGUIENTES:  * Sangrado abundante: saturar farzaneh toalla sanitaria rancho farzaneh hora o expulsar cualquier coágulo de mauricio ana laura (de 2 a 3 pulgadas).  *Cualquier dolor, enrojecimiento o sensibilidad en la parte inferior de la pierna.  *No puede cuidar de sí misma ni de patterson bebé.  *Algún signo de infección-      - Temperatura superior a 100.5 grados F      - Flujo vaginal maloliente y/o drenaje incisional      - Aumento de episiotomía o dolor incisional      - Área caliente, dura, kash o adolorida en el seno      - Síntomas similares a la gripe      - Cualquier urgencia, frecuencia o ardor al orinar      Regreso al hospital para farzaneh evaluación adicional:  ? Dolor de danielle que no se sarah con tylenol o ibuprofeno  ? Visión borrosa, visión doble, puntos de visión o luces intermitentes  ? Sensación de desmayo o desmayo  ? Dolor en epigastrio derecho  ? Respiración dificultosa  ? Hinchazón en sarah, arlyn o pies  ? Cualquiera de estos síntomas acompañado de náuseas/vómitos  ? Ganar más de 5 libras en farzaneh semana  ? convulsiones  Estos síntomas podrían ser farzaneh indicación de presión arterial elevada.      Si tiene alguna pregunta que deba responderse de inmediato, llame a la Unidad de trabajo de parto y parto al [phone] y pida hablar con farzaneh enfermera.

## 2022-11-07 NOTE — SUBJECTIVE & OBJECTIVE
Interval History: PPD 2    She is doing well this morning. She is tolerating a regular diet without nausea or vomiting. She is voiding spontaneously. She is ambulating. She has passed flatus, and has a BM. Vaginal bleeding is mild. She denies fever or chills. Abdominal pain is mild and controlled with oral medications. She Is not breastfeeding. She desires circumcision for her male baby: no.    Objective:     Vital Signs (Most Recent):  Temp: 98.1 °F (36.7 °C) (11/07/22 0817)  Pulse: 77 (11/07/22 0817)  Resp: 18 (11/07/22 0817)  BP: 103/66 (11/07/22 0817)  SpO2: 97 % (11/07/22 0817) Vital Signs (24h Range):  Temp:  [97.6 °F (36.4 °C)-98.2 °F (36.8 °C)] 98.1 °F (36.7 °C)  Pulse:  [63-77] 77  Resp:  [18] 18  SpO2:  [97 %-99 %] 97 %  BP: (101-117)/(52-78) 103/66        There is no height or weight on file to calculate BMI.    No intake or output data in the 24 hours ending 11/07/22 1038      Significant Labs:  Lab Results   Component Value Date    GROUPTRH O POS 11/05/2022    HEPBSAG Negative 07/21/2022    STREPBCULT No Group B Streptococcus isolated 10/26/2022     Recent Labs   Lab 11/05/22  1427   HGB 10.3*   HCT 33.6*       I have personallly reviewed all pertinent lab results from the last 24 hours.  Recent Lab Results       None            Physical Exam:   Constitutional: She is oriented to person, place, and time. She appears well-developed and well-nourished.    HENT:   Head: Normocephalic and atraumatic.    Eyes: Conjunctivae and EOM are normal.     Cardiovascular:  Normal rate, regular rhythm and normal heart sounds.             Pulmonary/Chest: Effort normal and breath sounds normal. No respiratory distress.        Abdominal: Soft. Bowel sounds are normal. She exhibits no distension. There is no abdominal tenderness.     Genitourinary:    Vagina and uterus normal.             Musculoskeletal: Normal range of motion and moves all extremeties.       Neurological: She is alert and oriented to person, place, and  time.    Skin: Skin is warm and dry.    Psychiatric: She has a normal mood and affect. Her behavior is normal. Judgment and thought content normal.

## 2022-11-07 NOTE — DISCHARGE SUMMARY
[facility] ([facility])  Obstetrics  Discharge Summary      Patient Name: Arabella Farias  MRN: [id number]  Admission Date: 2022  Hospital Length of Stay: 2 days  Discharge Date and Time:  2022 12:12 PM  Attending Physician: Ghazala Meade MD   Discharging Provider: Yaneli Hyde CNM   Primary Care Provider: Primary Doctor No    HPI:  38w3d here with c/o contractions          * No surgery found *     Hospital Course:   Anterior lip. SROM @ 1408. Prepare for delivery. Desires NCB.   22 MARTTI used for interpretation     22 PPD 2:   Visit completed with video  Lisa Lisa #[id number]  Doing well. No complaints  Discharge and home self care instructions discussed.            Final Active Diagnoses:    Diagnosis Date Noted POA    PRINCIPAL PROBLEM:   (spontaneous vaginal delivery) [O80] 2022 Not Applicable    Uses Brazilian as primary spoken language [Z78.9] 2022 Yes    Anemia [D64.9] 2022 Yes    Single live birth [Z37.0] 2022 Not Applicable    Hepatitis C antibody positive in blood [R76.8] 2022 Yes      Problems Resolved During this Admission:    Diagnosis Date Noted Date Resolved POA    Normal labor [O80, Z37.9] 2022 Not Applicable        Significant Diagnostic Studies: Labs: All labs within the past 24 hours have been reviewed      Feeding Method: both breast and bottle    Immunizations     Date Immunization Status Dose Route/Site Given by    22 1551 MMR Incomplete 0.5 mL Subcutaneous/     22 1551 Tdap Incomplete 0.5 mL Intramuscular/           Delivery:    Episiotomy: None   Lacerations: 1st   Repair suture: None   Repair # of packets:     Blood loss (ml):       Birth information:  YOB: 2023   Time of birth: 2:16 PM   Sex: male   Delivery type: Vaginal, Spontaneous   Gestational Age: 38w3d    Delivery Clinician:      Other providers:       Additional  information:  Forceps:     Vacuum:    Breech:    Observed anomalies      Living?:           APGARS  One minute Five minutes Ten minutes   Skin color:         Heart rate:         Grimace:         Muscle tone:         Breathing:         Totals: 8  9        Placenta: Delivered:       appearance    Pending Diagnostic Studies:     Procedure Component Value Units Date/Time    Hepatitis C RNA, quantitative, PCR [[id number]] Collected: 11/05/22 1501    Order Status: Sent Lab Status: In process Updated: 11/05/22 1846    Specimen: Blood           Discharged Condition: good    Disposition: Home or Self Care    Follow Up:   Follow-up Information     [facility], [facility]. Schedule an appointment as soon as possible for a visit in 4 week(s).    Why: Postpartum follow up                     Patient Instructions:      Activity as tolerated     Medications:  Current Discharge Medication List      START taking these medications    Details   ibuprofen (ADVIL,MOTRIN) 600 MG tablet Take 1 tablet (600 mg total) by mouth every 6 (six) hours as needed for Pain.  Qty: 30 tablet, Refills: 0         STOP taking these medications       prenatal vit103-iron fum-folic () 27 mg iron- 1 mg Tab Comments:   Reason for Stopping:             PATTIE Allison CNM  Obstetrics  [facility] ([facility])

## 2022-11-07 NOTE — PROGRESS NOTES
[facility] ([facility])  Obstetrics  Postpartum Progress Note    Patient Name: Arabella Farias  MRN: [id number]  Admission Date: 2022  Hospital Length of Stay: 1 days  Attending Physician: Ghazala Meade MD  Primary Care Provider: Primary Doctor No    Subjective:     Principal Problem: (spontaneous vaginal delivery)    Hospital Course:  Anterior lip. SROM @ 1408. Prepare for delivery. Desires NCB.   22 MARTTI used for interpretation       Interval History:     She is doing well this morning. She is tolerating a regular diet without nausea or vomiting. She is voiding spontaneously. She is ambulating.  Vaginal bleeding is mild. She denies fever or chills. Abdominal pain is mild and controlled with oral medications. She Is breastfeeding. She desires circumcision for her male baby: not applicable.    Objective:     Vital Signs (Most Recent):  Temp: 97.6 °F (36.4 °C) (22)  Pulse: 75 (22)  Resp: 18 (22)  BP: 117/78 (22)  SpO2: 99 % (22 1646) Vital Signs (24h Range):  Temp:  [97.6 °F (36.4 °C)-98.1 °F (36.7 °C)] 97.6 °F (36.4 °C)  Pulse:  [63-76] 75  Resp:  [18] 18  SpO2:  [98 %-100 %] 99 %  BP: (101-117)/(52-78) 117/78        There is no height or weight on file to calculate BMI.      Intake/Output Summary (Last 24 hours) at 20227  Last data filed at 2022 0200  Gross per 24 hour   Intake --   Output 650 ml   Net -650 ml         Significant Labs:  Lab Results   Component Value Date    GROUPTRH O POS 2022    HEPBSAG Negative 2022    STREPBCULT No Group B Streptococcus isolated 10/26/2022     Recent Labs   Lab 22  1427   HGB 10.3*   HCT 33.6*       I have personallly reviewed all pertinent lab results from the last 24 hours.    Physical Exam:   Constitutional: She is oriented to person, place, and time. She appears well-developed and well-nourished.        Pulmonary/Chest: Effort normal.                   Musculoskeletal: Normal range of motion and moves all extremeties.       Neurological: She is alert and oriented to person, place, and time.    Skin: Skin is warm and dry.    Psychiatric: She has a normal mood and affect. Her behavior is normal. Judgment and thought content normal.     Assessment/Plan:     21 y.o. female  for:    Anemia  CBC on admit    Uses Dominican as primary spoken language  Deana     Hepatitis C antibody positive in blood  Quant ordered        Disposition: As patient meets milestones, will plan to discharge 22.    Rosalva Bagley CNM  Obstetrics  [facility] ([facility])

## 2022-11-07 NOTE — PROGRESS NOTES
[facility] ([facility])  Obstetrics  Postpartum Progress Note    Patient Name: Arabella Farias  MRN: [id number]  Admission Date: 2022  Hospital Length of Stay: 2 days  Attending Physician: Ghazala Meade MD  Primary Care Provider: Primary Doctor No    Subjective:     Principal Problem: (spontaneous vaginal delivery)    Hospital Course:  Anterior lip. SROM @ 1408. Prepare for delivery. Desires NCB.   22 MARTTI used for interpretation     22 PPD 2:   Visit completed with video  Ivania #[id number]  Doing well. No complaints  Discharge and home self care instructions discussed.       Interval History: PPD 2    She is doing well this morning. She is tolerating a regular diet without nausea or vomiting. She is voiding spontaneously. She is ambulating. She has passed flatus, and has a BM. Vaginal bleeding is mild. She denies fever or chills. Abdominal pain is mild and controlled with oral medications. She Is not breastfeeding. She desires circumcision for her male baby: no.    Objective:     Vital Signs (Most Recent):  Temp: 98.1 °F (36.7 °C) (22)  Pulse: 77 (22)  Resp: 18 (22)  BP: 103/66 (22)  SpO2: 97 % (22) Vital Signs (24h Range):  Temp:  [97.6 °F (36.4 °C)-98.2 °F (36.8 °C)] 98.1 °F (36.7 °C)  Pulse:  [63-77] 77  Resp:  [18] 18  SpO2:  [97 %-99 %] 97 %  BP: (101-117)/(52-78) 103/66        There is no height or weight on file to calculate BMI.    No intake or output data in the 24 hours ending 22 1038      Significant Labs:  Lab Results   Component Value Date    GROUPTRH O POS 2022    HEPBSAG Negative 2022    STREPBCULT No Group B Streptococcus isolated 10/26/2022     Recent Labs   Lab 22  1427   HGB 10.3*   HCT 33.6*       I have personallly reviewed all pertinent lab results from the last 24 hours.  Recent Lab Results       None            Physical Exam:   Constitutional: She is oriented  to person, place, and time. She appears well-developed and well-nourished.    HENT:   Head: Normocephalic and atraumatic.    Eyes: Conjunctivae and EOM are normal.     Cardiovascular:  Normal rate, regular rhythm and normal heart sounds.             Pulmonary/Chest: Effort normal and breath sounds normal. No respiratory distress.        Abdominal: Soft. Bowel sounds are normal. She exhibits no distension. There is no abdominal tenderness.     Genitourinary:    Vagina and uterus normal.             Musculoskeletal: Normal range of motion and moves all extremeties.       Neurological: She is alert and oriented to person, place, and time.    Skin: Skin is warm and dry.    Psychiatric: She has a normal mood and affect. Her behavior is normal. Judgment and thought content normal.     Assessment/Plan:     21 y.o. female  for:    *  (spontaneous vaginal delivery)  Routine PP care    Single live birth  Cared for by Peds    Anemia  H/H 10.3/33.6 on admit.   Continue PP iron    Uses Swedish as primary spoken language  Deana     Hepatitis C antibody positive in blood  Quant ordered        Disposition: As patient meets milestones, will plan to discharge today.    PATTIE Allison, DANIEL  Obstetrics  [facility] ([facility])

## 2022-11-07 NOTE — SUBJECTIVE & OBJECTIVE
Interval History:     She is doing well this morning. She is tolerating a regular diet without nausea or vomiting. She is voiding spontaneously. She is ambulating.  Vaginal bleeding is mild. She denies fever or chills. Abdominal pain is mild and controlled with oral medications. She Is breastfeeding. She desires circumcision for her male baby: not applicable.    Objective:     Vital Signs (Most Recent):  Temp: 97.6 °F (36.4 °C) (11/06/22 1901)  Pulse: 75 (11/06/22 1901)  Resp: 18 (11/06/22 1901)  BP: 117/78 (11/06/22 1901)  SpO2: 99 % (11/06/22 1646) Vital Signs (24h Range):  Temp:  [97.6 °F (36.4 °C)-98.1 °F (36.7 °C)] 97.6 °F (36.4 °C)  Pulse:  [63-76] 75  Resp:  [18] 18  SpO2:  [98 %-100 %] 99 %  BP: (101-117)/(52-78) 117/78        There is no height or weight on file to calculate BMI.      Intake/Output Summary (Last 24 hours) at 11/6/2022 2207  Last data filed at 11/6/2022 0200  Gross per 24 hour   Intake --   Output 650 ml   Net -650 ml         Significant Labs:  Lab Results   Component Value Date    GROUPTRH O POS 11/05/2022    HEPBSAG Negative 07/21/2022    STREPBCULT No Group B Streptococcus isolated 10/26/2022     Recent Labs   Lab 11/05/22  1427   HGB 10.3*   HCT 33.6*       I have personallly reviewed all pertinent lab results from the last 24 hours.    Physical Exam:   Constitutional: She is oriented to person, place, and time. She appears well-developed and well-nourished.        Pulmonary/Chest: Effort normal.                  Musculoskeletal: Normal range of motion and moves all extremeties.       Neurological: She is alert and oriented to person, place, and time.    Skin: Skin is warm and dry.    Psychiatric: She has a normal mood and affect. Her behavior is normal. Judgment and thought content normal.

## 2022-11-07 NOTE — LACTATION NOTE
Lactation Rounds:   Mexican Language Line  #[id number] used for this encounter.     Mother reports that breastfeeding is going well and she is getting more colostrum per hand expression. Intake and out wnl. Reviewed expected  behaviors, feeding patterns and output for the first 48 hours of life. Discussed the importance of cluster feeding and cue based feedings on demand, unrestricted access to the breast, and frequent uninterrupted skin to skin contact. Discussed effective positioning and latching, and signs of milk transfer. Offered assistance as needed.      Lactation availability provided. Mother denies any further lactation needs or concerns at this time. Encouraged mother to call for assistance when desired or when infant is showing signs of hunger. Mother verbalizes understanding of all education and counseling.

## 2022-11-07 NOTE — PLAN OF CARE
Discharge instructions received and reviewed with mother and father at bedside.  Pt voiced understanding and all questions answered to satisfaction.  Appointments scheduled.   Stressed importance to making and keeping all follow up appointments.  Medications delivered to pt at bedside.   Pt transported to front of hospital via w/c by nurse to be discharged home.

## 2022-11-08 LAB
HCV RNA SERPL QL NAA+PROBE: NOT DETECTED
HCV RNA SPEC NAA+PROBE-ACNC: <12 IU/ML

## 2025-06-12 ENCOUNTER — HOSPITAL ENCOUNTER (OUTPATIENT)
Facility: HOSPITAL | Age: 25
Discharge: HOME | End: 2025-06-13
Attending: EMERGENCY MEDICINE | Admitting: OBSTETRICS & GYNECOLOGY

## 2025-06-12 DIAGNOSIS — O03.9 MISCARRIAGE: ICD-10-CM

## 2025-06-12 DIAGNOSIS — R00.0 TACHYCARDIA: ICD-10-CM

## 2025-06-12 DIAGNOSIS — D64.9 ANEMIA, UNSPECIFIED TYPE: Primary | ICD-10-CM

## 2025-06-12 DIAGNOSIS — R55 SYNCOPE: ICD-10-CM

## 2025-06-12 DIAGNOSIS — O03.4 INCOMPLETE ABORTION: ICD-10-CM

## 2025-06-12 LAB
ABSOLUTE EOSINOPHIL (OHS): 0.01 K/UL
ABSOLUTE MONOCYTE (OHS): 0.33 K/UL (ref 0.3–1)
ABSOLUTE NEUTROPHIL COUNT (OHS): 10.97 K/UL (ref 1.8–7.7)
ALBUMIN SERPL BCP-MCNC: 3.6 G/DL (ref 3.5–5.2)
ALP SERPL-CCNC: 78 UNIT/L (ref 40–150)
ALT SERPL W/O P-5'-P-CCNC: 8 UNIT/L (ref 10–44)
ANION GAP (OHS): 12 MMOL/L (ref 8–16)
APTT PPP: 24.2 SECONDS (ref 21–32)
AST SERPL-CCNC: 5 UNIT/L (ref 11–45)
B-HCG UR QL: POSITIVE
BASOPHILS # BLD AUTO: 0.04 K/UL
BASOPHILS NFR BLD AUTO: 0.3 %
BILIRUB SERPL-MCNC: 0.4 MG/DL (ref 0.1–1)
BILIRUB UR QL STRIP.AUTO: NEGATIVE
BUN SERPL-MCNC: 7 MG/DL (ref 6–20)
CALCIUM SERPL-MCNC: 9 MG/DL (ref 8.7–10.5)
CHLORIDE SERPL-SCNC: 104 MMOL/L (ref 95–110)
CLARITY UR: CLEAR
CO2 SERPL-SCNC: 16 MMOL/L (ref 23–29)
COLOR UR AUTO: YELLOW
CREAT SERPL-MCNC: 0.7 MG/DL (ref 0.5–1.4)
ERYTHROCYTE [DISTWIDTH] IN BLOOD BY AUTOMATED COUNT: 13.7 % (ref 11.5–14.5)
GFR SERPLBLD CREATININE-BSD FMLA CKD-EPI: >60 ML/MIN/1.73/M2
GLUCOSE SERPL-MCNC: 128 MG/DL (ref 70–110)
GLUCOSE UR QL STRIP: NEGATIVE
HCT VFR BLD AUTO: 29.5 % (ref 37–48.5)
HGB BLD-MCNC: 9.8 GM/DL (ref 12–16)
HGB UR QL STRIP: NEGATIVE
HIV 1+2 AB+HIV1 P24 AG SERPL QL IA: NEGATIVE
IMM GRANULOCYTES # BLD AUTO: 0.04 K/UL (ref 0–0.04)
IMM GRANULOCYTES NFR BLD AUTO: 0.3 % (ref 0–0.5)
INDIRECT COOMBS: NORMAL
INR PPP: 1 (ref 0.8–1.2)
KETONES UR QL STRIP: NEGATIVE
LEUKOCYTE ESTERASE UR QL STRIP: NEGATIVE
LYMPHOCYTES # BLD AUTO: 1.1 K/UL (ref 1–4.8)
MCH RBC QN AUTO: 27 PG (ref 27–31)
MCHC RBC AUTO-ENTMCNC: 33.2 G/DL (ref 32–36)
MCV RBC AUTO: 81 FL (ref 82–98)
NITRITE UR QL STRIP: NEGATIVE
NUCLEATED RBC (/100WBC) (OHS): 0 /100 WBC
PH UR STRIP: 7 [PH]
PLATELET # BLD AUTO: 295 K/UL (ref 150–450)
PMV BLD AUTO: 10.3 FL (ref 9.2–12.9)
POTASSIUM SERPL-SCNC: 3.7 MMOL/L (ref 3.5–5.1)
PROT SERPL-MCNC: 7.5 GM/DL (ref 6–8.4)
PROT UR QL STRIP: ABNORMAL
PROTHROMBIN TIME: 11.3 SECONDS (ref 9–12.5)
RBC # BLD AUTO: 3.63 M/UL (ref 4–5.4)
RELATIVE EOSINOPHIL (OHS): 0.1 %
RELATIVE LYMPHOCYTE (OHS): 8.8 % (ref 18–48)
RELATIVE MONOCYTE (OHS): 2.6 % (ref 4–15)
RELATIVE NEUTROPHIL (OHS): 87.9 % (ref 38–73)
RH BLD: NORMAL
SODIUM SERPL-SCNC: 132 MMOL/L (ref 136–145)
SP GR UR STRIP: 1.02
SPECIMEN OUTDATE: NORMAL
TROPONIN I SERPL DL<=0.01 NG/ML-MCNC: <0.006 NG/ML
UROBILINOGEN UR STRIP-ACNC: NEGATIVE EU/DL
WBC # BLD AUTO: 12.49 K/UL (ref 3.9–12.7)

## 2025-06-12 PROCEDURE — 84484 ASSAY OF TROPONIN QUANT: CPT | Performed by: EMERGENCY MEDICINE

## 2025-06-12 PROCEDURE — 81003 URINALYSIS AUTO W/O SCOPE: CPT | Performed by: EMERGENCY MEDICINE

## 2025-06-12 PROCEDURE — 85610 PROTHROMBIN TIME: CPT | Performed by: EMERGENCY MEDICINE

## 2025-06-12 PROCEDURE — 93010 ELECTROCARDIOGRAM REPORT: CPT | Mod: ,,, | Performed by: INTERNAL MEDICINE

## 2025-06-12 PROCEDURE — 86850 RBC ANTIBODY SCREEN: CPT | Performed by: EMERGENCY MEDICINE

## 2025-06-12 PROCEDURE — 25000003 PHARM REV CODE 250: Performed by: EMERGENCY MEDICINE

## 2025-06-12 PROCEDURE — 86803 HEPATITIS C AB TEST: CPT | Performed by: EMERGENCY MEDICINE

## 2025-06-12 PROCEDURE — 96365 THER/PROPH/DIAG IV INF INIT: CPT

## 2025-06-12 PROCEDURE — 93005 ELECTROCARDIOGRAM TRACING: CPT

## 2025-06-12 PROCEDURE — 80053 COMPREHEN METABOLIC PANEL: CPT | Performed by: EMERGENCY MEDICINE

## 2025-06-12 PROCEDURE — 96361 HYDRATE IV INFUSION ADD-ON: CPT

## 2025-06-12 PROCEDURE — 99285 EMERGENCY DEPT VISIT HI MDM: CPT | Mod: 25

## 2025-06-12 PROCEDURE — 85025 COMPLETE CBC W/AUTO DIFF WBC: CPT | Performed by: EMERGENCY MEDICINE

## 2025-06-12 PROCEDURE — 85730 THROMBOPLASTIN TIME PARTIAL: CPT | Performed by: EMERGENCY MEDICINE

## 2025-06-12 PROCEDURE — 99291 CRITICAL CARE FIRST HOUR: CPT

## 2025-06-12 PROCEDURE — 87522 HEPATITIS C REVRS TRNSCRPJ: CPT | Performed by: EMERGENCY MEDICINE

## 2025-06-12 PROCEDURE — 87389 HIV-1 AG W/HIV-1&-2 AB AG IA: CPT | Performed by: EMERGENCY MEDICINE

## 2025-06-12 PROCEDURE — 81025 URINE PREGNANCY TEST: CPT | Performed by: EMERGENCY MEDICINE

## 2025-06-12 RX ORDER — HYDROCODONE BITARTRATE AND ACETAMINOPHEN 500; 5 MG/1; MG/1
TABLET ORAL
Status: DISCONTINUED | OUTPATIENT
Start: 2025-06-13 | End: 2025-06-13 | Stop reason: HOSPADM

## 2025-06-12 RX ORDER — TRANEXAMIC ACID 10 MG/ML
1000 INJECTION, SOLUTION INTRAVENOUS ONCE
Status: COMPLETED | OUTPATIENT
Start: 2025-06-12 | End: 2025-06-12

## 2025-06-12 RX ORDER — SODIUM CHLORIDE, SODIUM LACTATE, POTASSIUM CHLORIDE, CALCIUM CHLORIDE 600; 310; 30; 20 MG/100ML; MG/100ML; MG/100ML; MG/100ML
INJECTION, SOLUTION INTRAVENOUS CONTINUOUS
OUTPATIENT
Start: 2025-06-13

## 2025-06-12 RX ORDER — METHYLERGONOVINE MALEATE 0.2 MG/ML
200 INJECTION INTRAVENOUS ONCE
Status: COMPLETED | OUTPATIENT
Start: 2025-06-13 | End: 2025-06-13

## 2025-06-12 RX ORDER — MISOPROSTOL 100 UG/1
100 TABLET ORAL
Status: COMPLETED | OUTPATIENT
Start: 2025-06-12 | End: 2025-06-13

## 2025-06-12 RX ADMIN — SODIUM CHLORIDE 1000 ML: 9 INJECTION, SOLUTION INTRAVENOUS at 10:06

## 2025-06-12 RX ADMIN — TRANEXAMIC ACID 1000 MG: 10 INJECTION, SOLUTION INTRAVENOUS at 10:06

## 2025-06-13 ENCOUNTER — ANESTHESIA EVENT (OUTPATIENT)
Dept: SURGERY | Facility: HOSPITAL | Age: 25
End: 2025-06-13

## 2025-06-13 ENCOUNTER — RESULTS FOLLOW-UP (OUTPATIENT)
Dept: EMERGENCY MEDICINE | Facility: HOSPITAL | Age: 25
End: 2025-06-13

## 2025-06-13 ENCOUNTER — ANESTHESIA (OUTPATIENT)
Dept: SURGERY | Facility: HOSPITAL | Age: 25
End: 2025-06-13

## 2025-06-13 VITALS
BODY MASS INDEX: 32.25 KG/M2 | TEMPERATURE: 99 F | OXYGEN SATURATION: 99 % | HEART RATE: 86 BPM | DIASTOLIC BLOOD PRESSURE: 55 MMHG | SYSTOLIC BLOOD PRESSURE: 109 MMHG | RESPIRATION RATE: 19 BRPM | WEIGHT: 165.13 LBS

## 2025-06-13 LAB
ABSOLUTE EOSINOPHIL (OHS): 0 K/UL
ABSOLUTE EOSINOPHIL (OHS): 0.11 K/UL
ABSOLUTE MONOCYTE (OHS): 0.14 K/UL (ref 0.3–1)
ABSOLUTE MONOCYTE (OHS): 0.47 K/UL (ref 0.3–1)
ABSOLUTE NEUTROPHIL COUNT (OHS): 4.65 K/UL (ref 1.8–7.7)
ABSOLUTE NEUTROPHIL COUNT (OHS): 8.55 K/UL (ref 1.8–7.7)
BASOPHILS # BLD AUTO: 0.01 K/UL
BASOPHILS # BLD AUTO: 0.03 K/UL
BASOPHILS NFR BLD AUTO: 0.1 %
BASOPHILS NFR BLD AUTO: 0.4 %
ERYTHROCYTE [DISTWIDTH] IN BLOOD BY AUTOMATED COUNT: 13.9 % (ref 11.5–14.5)
ERYTHROCYTE [DISTWIDTH] IN BLOOD BY AUTOMATED COUNT: 14.2 % (ref 11.5–14.5)
HCT VFR BLD AUTO: 23 % (ref 37–48.5)
HCT VFR BLD AUTO: 25.1 % (ref 37–48.5)
HCV AB SERPL QL IA: POSITIVE
HGB BLD-MCNC: 7.2 GM/DL (ref 12–16)
HGB BLD-MCNC: 8.1 GM/DL (ref 12–16)
HOLD SPECIMEN: NORMAL
IMM GRANULOCYTES # BLD AUTO: 0.01 K/UL (ref 0–0.04)
IMM GRANULOCYTES # BLD AUTO: 0.03 K/UL (ref 0–0.04)
IMM GRANULOCYTES NFR BLD AUTO: 0.1 % (ref 0–0.5)
IMM GRANULOCYTES NFR BLD AUTO: 0.3 % (ref 0–0.5)
LYMPHOCYTES # BLD AUTO: 1.57 K/UL (ref 1–4.8)
LYMPHOCYTES # BLD AUTO: 2.42 K/UL (ref 1–4.8)
MCH RBC QN AUTO: 26.9 PG (ref 27–31)
MCH RBC QN AUTO: 27 PG (ref 27–31)
MCHC RBC AUTO-ENTMCNC: 31.3 G/DL (ref 32–36)
MCHC RBC AUTO-ENTMCNC: 32.3 G/DL (ref 32–36)
MCV RBC AUTO: 84 FL (ref 82–98)
MCV RBC AUTO: 86 FL (ref 82–98)
NUCLEATED RBC (/100WBC) (OHS): 0 /100 WBC
NUCLEATED RBC (/100WBC) (OHS): 0 /100 WBC
PLATELET # BLD AUTO: 229 K/UL (ref 150–450)
PLATELET # BLD AUTO: 245 K/UL (ref 150–450)
PMV BLD AUTO: 10.2 FL (ref 9.2–12.9)
PMV BLD AUTO: 10.5 FL (ref 9.2–12.9)
RBC # BLD AUTO: 2.68 M/UL (ref 4–5.4)
RBC # BLD AUTO: 3 M/UL (ref 4–5.4)
RELATIVE EOSINOPHIL (OHS): 0 %
RELATIVE EOSINOPHIL (OHS): 1.4 %
RELATIVE LYMPHOCYTE (OHS): 15.2 % (ref 18–48)
RELATIVE LYMPHOCYTE (OHS): 31.5 % (ref 18–48)
RELATIVE MONOCYTE (OHS): 1.4 % (ref 4–15)
RELATIVE MONOCYTE (OHS): 6.1 % (ref 4–15)
RELATIVE NEUTROPHIL (OHS): 60.5 % (ref 38–73)
RELATIVE NEUTROPHIL (OHS): 83 % (ref 38–73)
WBC # BLD AUTO: 10.3 K/UL (ref 3.9–12.7)
WBC # BLD AUTO: 7.69 K/UL (ref 3.9–12.7)

## 2025-06-13 PROCEDURE — 86920 COMPATIBILITY TEST SPIN: CPT | Performed by: OBSTETRICS & GYNECOLOGY

## 2025-06-13 PROCEDURE — 37000008 HC ANESTHESIA 1ST 15 MINUTES: Performed by: OBSTETRICS & GYNECOLOGY

## 2025-06-13 PROCEDURE — G0378 HOSPITAL OBSERVATION PER HR: HCPCS

## 2025-06-13 PROCEDURE — 63600175 PHARM REV CODE 636 W HCPCS: Performed by: EMERGENCY MEDICINE

## 2025-06-13 PROCEDURE — 37000009 HC ANESTHESIA EA ADD 15 MINS: Performed by: OBSTETRICS & GYNECOLOGY

## 2025-06-13 PROCEDURE — 63600175 PHARM REV CODE 636 W HCPCS: Performed by: NURSE ANESTHETIST, CERTIFIED REGISTERED

## 2025-06-13 PROCEDURE — 63600175 PHARM REV CODE 636 W HCPCS: Performed by: OBSTETRICS & GYNECOLOGY

## 2025-06-13 PROCEDURE — 25000003 PHARM REV CODE 250: Performed by: OBSTETRICS & GYNECOLOGY

## 2025-06-13 PROCEDURE — 88305 TISSUE EXAM BY PATHOLOGIST: CPT | Mod: TC,59 | Performed by: OBSTETRICS & GYNECOLOGY

## 2025-06-13 PROCEDURE — 99223 1ST HOSP IP/OBS HIGH 75: CPT | Mod: 57,,, | Performed by: OBSTETRICS & GYNECOLOGY

## 2025-06-13 PROCEDURE — 36415 COLL VENOUS BLD VENIPUNCTURE: CPT | Performed by: OBSTETRICS & GYNECOLOGY

## 2025-06-13 PROCEDURE — 25000003 PHARM REV CODE 250: Performed by: EMERGENCY MEDICINE

## 2025-06-13 PROCEDURE — 71000033 HC RECOVERY, INTIAL HOUR: Performed by: OBSTETRICS & GYNECOLOGY

## 2025-06-13 PROCEDURE — 36000704 HC OR TIME LEV I 1ST 15 MIN: Performed by: OBSTETRICS & GYNECOLOGY

## 2025-06-13 PROCEDURE — 85025 COMPLETE CBC W/AUTO DIFF WBC: CPT | Performed by: OBSTETRICS & GYNECOLOGY

## 2025-06-13 PROCEDURE — 85025 COMPLETE CBC W/AUTO DIFF WBC: CPT | Mod: 91 | Performed by: OBSTETRICS & GYNECOLOGY

## 2025-06-13 PROCEDURE — 88305 TISSUE EXAM BY PATHOLOGIST: CPT | Mod: 26,,, | Performed by: PATHOLOGY

## 2025-06-13 PROCEDURE — 36000705 HC OR TIME LEV I EA ADD 15 MIN: Performed by: OBSTETRICS & GYNECOLOGY

## 2025-06-13 PROCEDURE — 59812 TREATMENT OF MISCARRIAGE: CPT | Mod: ,,, | Performed by: OBSTETRICS & GYNECOLOGY

## 2025-06-13 PROCEDURE — 96372 THER/PROPH/DIAG INJ SC/IM: CPT | Performed by: EMERGENCY MEDICINE

## 2025-06-13 PROCEDURE — 27200651 HC AIRWAY, LMA: Performed by: ANESTHESIOLOGY

## 2025-06-13 RX ORDER — HYDROCODONE BITARTRATE AND ACETAMINOPHEN 5; 325 MG/1; MG/1
1 TABLET ORAL EVERY 4 HOURS PRN
Status: DISCONTINUED | OUTPATIENT
Start: 2025-06-13 | End: 2025-06-13 | Stop reason: HOSPADM

## 2025-06-13 RX ORDER — IBUPROFEN 600 MG/1
600 TABLET, FILM COATED ORAL EVERY 6 HOURS PRN
Status: DISCONTINUED | OUTPATIENT
Start: 2025-06-13 | End: 2025-06-13 | Stop reason: HOSPADM

## 2025-06-13 RX ORDER — HYDROMORPHONE HYDROCHLORIDE 2 MG/ML
0.2 INJECTION, SOLUTION INTRAMUSCULAR; INTRAVENOUS; SUBCUTANEOUS EVERY 5 MIN PRN
Status: DISCONTINUED | OUTPATIENT
Start: 2025-06-13 | End: 2025-06-13 | Stop reason: HOSPADM

## 2025-06-13 RX ORDER — PROPOFOL 10 MG/ML
VIAL (ML) INTRAVENOUS
Status: DISCONTINUED | OUTPATIENT
Start: 2025-06-13 | End: 2025-06-13

## 2025-06-13 RX ORDER — MIDAZOLAM HYDROCHLORIDE 1 MG/ML
INJECTION INTRAMUSCULAR; INTRAVENOUS
Status: DISCONTINUED | OUTPATIENT
Start: 2025-06-13 | End: 2025-06-13

## 2025-06-13 RX ORDER — ONDANSETRON HYDROCHLORIDE 2 MG/ML
4 INJECTION, SOLUTION INTRAVENOUS ONCE AS NEEDED
Status: DISCONTINUED | OUTPATIENT
Start: 2025-06-13 | End: 2025-06-13 | Stop reason: HOSPADM

## 2025-06-13 RX ORDER — ONDANSETRON HYDROCHLORIDE 2 MG/ML
4 INJECTION, SOLUTION INTRAVENOUS DAILY PRN
Status: DISCONTINUED | OUTPATIENT
Start: 2025-06-13 | End: 2025-06-13 | Stop reason: HOSPADM

## 2025-06-13 RX ORDER — FENTANYL CITRATE 50 UG/ML
25 INJECTION, SOLUTION INTRAMUSCULAR; INTRAVENOUS EVERY 5 MIN PRN
Status: DISCONTINUED | OUTPATIENT
Start: 2025-06-13 | End: 2025-06-13 | Stop reason: HOSPADM

## 2025-06-13 RX ORDER — OXYCODONE AND ACETAMINOPHEN 5; 325 MG/1; MG/1
1 TABLET ORAL
Status: DISCONTINUED | OUTPATIENT
Start: 2025-06-13 | End: 2025-06-13 | Stop reason: HOSPADM

## 2025-06-13 RX ORDER — ONDANSETRON 8 MG/1
8 TABLET, ORALLY DISINTEGRATING ORAL EVERY 8 HOURS PRN
Status: DISCONTINUED | OUTPATIENT
Start: 2025-06-13 | End: 2025-06-13 | Stop reason: HOSPADM

## 2025-06-13 RX ORDER — ONDANSETRON HYDROCHLORIDE 2 MG/ML
INJECTION, SOLUTION INTRAVENOUS
Status: DISCONTINUED | OUTPATIENT
Start: 2025-06-13 | End: 2025-06-13

## 2025-06-13 RX ORDER — SODIUM CHLORIDE, SODIUM LACTATE, POTASSIUM CHLORIDE, CALCIUM CHLORIDE 600; 310; 30; 20 MG/100ML; MG/100ML; MG/100ML; MG/100ML
INJECTION, SOLUTION INTRAVENOUS CONTINUOUS PRN
Status: DISCONTINUED | OUTPATIENT
Start: 2025-06-13 | End: 2025-06-13

## 2025-06-13 RX ORDER — LIDOCAINE HYDROCHLORIDE 10 MG/ML
INJECTION, SOLUTION EPIDURAL; INFILTRATION; INTRACAUDAL; PERINEURAL
Status: DISCONTINUED | OUTPATIENT
Start: 2025-06-13 | End: 2025-06-13

## 2025-06-13 RX ORDER — HYDROCODONE BITARTRATE AND ACETAMINOPHEN 10; 325 MG/1; MG/1
1 TABLET ORAL EVERY 4 HOURS PRN
Status: DISCONTINUED | OUTPATIENT
Start: 2025-06-13 | End: 2025-06-13 | Stop reason: HOSPADM

## 2025-06-13 RX ORDER — SODIUM CHLORIDE 9 MG/ML
INJECTION, SOLUTION INTRAVENOUS CONTINUOUS
Status: DISCONTINUED | OUTPATIENT
Start: 2025-06-13 | End: 2025-06-13 | Stop reason: HOSPADM

## 2025-06-13 RX ORDER — PROCHLORPERAZINE EDISYLATE 5 MG/ML
5 INJECTION INTRAMUSCULAR; INTRAVENOUS EVERY 6 HOURS PRN
Status: DISCONTINUED | OUTPATIENT
Start: 2025-06-13 | End: 2025-06-13 | Stop reason: HOSPADM

## 2025-06-13 RX ORDER — FENTANYL CITRATE 50 UG/ML
INJECTION, SOLUTION INTRAMUSCULAR; INTRAVENOUS
Status: DISCONTINUED | OUTPATIENT
Start: 2025-06-13 | End: 2025-06-13

## 2025-06-13 RX ADMIN — METHYLERGONOVINE MALEATE 200 MCG: 0.2 INJECTION, SOLUTION INTRAMUSCULAR; INTRAVENOUS at 12:06

## 2025-06-13 RX ADMIN — PROPOFOL 70 MG: 10 INJECTION, EMULSION INTRAVENOUS at 01:06

## 2025-06-13 RX ADMIN — SODIUM CHLORIDE: 9 INJECTION, SOLUTION INTRAVENOUS at 02:06

## 2025-06-13 RX ADMIN — LIDOCAINE HYDROCHLORIDE 50 MG: 10 SOLUTION INTRAVENOUS at 01:06

## 2025-06-13 RX ADMIN — SODIUM CHLORIDE, SODIUM LACTATE, POTASSIUM CHLORIDE, AND CALCIUM CHLORIDE: 600; 310; 30; 20 INJECTION, SOLUTION INTRAVENOUS at 01:06

## 2025-06-13 RX ADMIN — FENTANYL CITRATE 100 MCG: 50 INJECTION, SOLUTION INTRAMUSCULAR; INTRAVENOUS at 01:06

## 2025-06-13 RX ADMIN — MIDAZOLAM HYDROCHLORIDE 2 MG: 1 INJECTION, SOLUTION INTRAMUSCULAR; INTRAVENOUS at 01:06

## 2025-06-13 RX ADMIN — DOXYCYCLINE 200 MG: 100 INJECTION, POWDER, LYOPHILIZED, FOR SOLUTION INTRAVENOUS at 01:06

## 2025-06-13 RX ADMIN — MISOPROSTOL 100 MCG: 100 TABLET ORAL at 12:06

## 2025-06-13 RX ADMIN — ONDANSETRON 4 MG: 2 INJECTION INTRAMUSCULAR; INTRAVENOUS at 01:06

## 2025-06-13 NOTE — NURSING TRANSFER
Nursing Transfer Note      6/13/2025   02:15 AM    Nurse giving handoff:CHELITA Dukes  Nurse receiving handoff:CHELITA Zhang    Reason patient is being transferred: post op D&C    Transfer From:  PACU    Transfer via stretcher      Transported by Roseline PERDOMO RN    Transfer Vital Signs:  Blood Pressure:96/55  Heart Rate:81  O2:97  Temperature:98.1  Respirations:18    Order for Tele Monitor? No    Additional Lines: n/a    Medicines sent: n/a    Any special needs or follow-up needed: needs     Patient belongings transferred with patient: Yes    Chart send with patient: Yes    Notified: mother    Patient reassessed at:6/13/2025 @ 0215    Upon arrival to floor: patient oriented to room, call bell in reach, and bed in lowest position, reminded nurse to watch for CBC results drawn in PACU

## 2025-06-13 NOTE — SUBJECTIVE & OBJECTIVE
OB History    Para Term  AB Living   4 3 3 0 0 3   SAB IAB Ectopic Multiple Live Births   0 0 0 0 3      # Outcome Date GA Lbr John/2nd Weight Sex Type Anes PTL Lv   4 Current            3 Term 22 38w3d  3.374 kg (7 lb 7 oz) M Vag-Spont None N CROW      Name: JAIRO FREEMAN      Apgar1: 8  Apgar5: 9   2 Term      Vag-Spont   CROW   1 Term      Vag-Spont   CROW     History reviewed. No pertinent past medical history.  History reviewed. No pertinent surgical history.    (Not in a hospital admission)      Review of patient's allergies indicates:  No Known Allergies     Family History    None       Tobacco Use    Smoking status: Never    Smokeless tobacco: Never   Substance and Sexual Activity    Alcohol use: Not Currently    Drug use: Not Currently    Sexual activity: Yes     Partners: Male     Review of Systems   Constitutional:  Positive for fatigue. Negative for fever and unexpected weight change.   Respiratory:  Positive for shortness of breath.    Cardiovascular:  Positive for palpitations. Negative for chest pain.   Gastrointestinal:  Negative for abdominal pain, constipation, diarrhea, nausea and vomiting.   Genitourinary:  Positive for pelvic pain (mild cramping) and vaginal bleeding (heavy, large clots). Negative for dysuria, frequency, urgency, vaginal discharge, vaginal pain, postcoital bleeding and vaginal odor.   Neurological:  Positive for syncope.      Objective:     Vital Signs (Most Recent):  Temp: 98.3 °F (36.8 °C) (25)  Pulse: 107 (25)  Resp: 16 (25)  BP: (!) 103/54 (25)  SpO2: 100 % (25) Vital Signs (24h Range):  Temp:  [98.3 °F (36.8 °C)] 98.3 °F (36.8 °C)  Pulse:  [] 107  Resp:  [12-33] 16  SpO2:  [98 %-100 %] 100 %  BP: ()/(38-71) 103/54     Weight: 74.9 kg (165 lb 2 oz)  Body mass index is 32.25 kg/m².         Physical Exam:   Constitutional: She is oriented to person, place, and time. She appears  well-developed and well-nourished. No distress.             Abdominal: Soft. She exhibits no distension and no mass. There is no abdominal tenderness. There is no rebound and no guarding. Hernia confirmed negative in the right inguinal area and confirmed negative in the left inguinal area.     Genitourinary:    Pelvic exam was performed with patient supine.   There is no rash, tenderness, lesion or injury on the right labia. There is no rash, tenderness, lesion or injury on the left labia. Right adnexum displays no mass, no tenderness and no fullness. Left adnexum displays no mass, no tenderness and no fullness. There is bleeding (heavy bleeding with blood pooling in the diaper she's wearing) in the vagina. No erythema, vaginal discharge, tenderness, rectocele, cystocele or prolapse of vaginal walls in the vagina.    No foreign body in the vagina.      No signs of injury in the vagina.   Cervix exhibits no motion tenderness, no discharge and no friability. Uterus is enlarged. Uterus is not deviated, not fixed and not tender. Uterus size: 10 cm.   Genitourinary Comments: Cervix about 1cm dilated with clot palpated at the os                 Neurological: She is alert and oriented to person, place, and time.    Skin: There is pallor.    Psychiatric: She has a normal mood and affect.             Significant Labs:  Lab Results   Component Value Date    GROUPTRH O POS 06/12/2025    HEPBSAG Negative 07/21/2022    STREPBCULT No Group B Streptococcus isolated 10/26/2022       Recent Lab Results         06/12/25  2227   06/12/25  2155        Albumin   3.6       ALP   78       ALT   8       Anion Gap   12       Appearance, UA Clear         PTT   24.2  Comment: Refer to local heparin nomogram for intensity/dose specific therapeutic range.       AST   5       Baso #   0.04       Basophil %   0.3       Bilirubin (UA) Negative         BILIRUBIN TOTAL   0.4  Comment: For infants and newborns, interpretation of results should be  based   on gestational age, weight and in agreement with clinical   observations.    Premature Infant recommended reference ranges:   0-24 hours:  <8.0 mg/dL   24-48 hours: <12.0 mg/dL   3-5 days:    <15.0 mg/dL   6-29 days:   <15.0 mg/dL       BUN   7       Calcium   9.0       Chloride   104       CO2   16       Color, UA Yellow         Creatinine   0.7       eGFR   >60  Comment: Estimated GFR calculated using the CKD-EPI creatinine (2021) equation.       Eos #   0.01       Eos %   0.1       Glucose   128       Glucose, UA Negative         Gran # (ANC)   10.97       Group & Rh   O POS       Hematocrit   29.5       Hemoglobin   9.8       HIV 1/2 Ag/Ab   Negative       Immature Grans (Abs)   0.04  Comment: Mild elevation in immature granulocytes is non specific and can be seen in a variety of conditions including stress response, acute inflammation, trauma and pregnancy. Correlation with other laboratory and clinical findings is essential.       Immature Granulocytes   0.3       INDIRECT AMBERLY   NEG       INR   1.0  Comment: Coumadin Therapy:    2.0 - 3.0 for INR for all indicators except mechanical heart valves    and antiphospholipid syndromes which should use 2.5 - 3.5.       Ketones, UA Negative         Leukocyte Esterase, UA Negative         Lymph #   1.10       Lymph %   8.8       MCH   27.0       MCHC   33.2       MCV   81       Mono #   0.33       Mono %   2.6       MPV   10.3       Neut %   87.9       NITRITE UA Negative         nRBC   0       Blood, UA Negative         pH, UA 7.0         Platelet Count   295       Potassium   3.7       hCG Qualitative, Urine Positive         PROTEIN TOTAL   7.5       Protein, UA Trace  Comment: Recommend a 24 hour urine protein or a urine protein/creatinine ratio if globulin induced proteinuria is clinically suspected.         PT   11.3       RBC   3.63       RDW   13.7       Sodium   132       Spec Grav UA 1.025         Specimen Outdate   06/15/2025 23:59       Troponin I    <0.006  Comment: The reference interval for Troponin I represents the 99th percentile cutoff for our facility and is consistent with 3rd generation assay performance.       Urobilinogen, UA Negative         WBC   12.49

## 2025-06-13 NOTE — H&P
[facility] - Emergency Dept.  Obstetrics  History & Physical    Patient Name: Arabella Farias  MRN: [id number]  Admission Date: 2025  Primary Care Provider: Keyonna, Primary Doctor    Subjective:     Principal Problem:Incomplete     History of Present Illness:  25 y/o  presented to the ER with her brother for heavy vaginal bleeding and 2 episodes of loss of consciousness at home.  Pt started having very heavy bleeding this morning, passing large clots.  She then became very weak and lightheaded.  She lost consciousness twice at home, so presented to the ED where she had another syncopal episode.  UPT in the ED is positive.  Pt was unaware she is pregnant.  Ultrasound with heterogenous material within the endometrium with flow on doppler.  Findings concerning for retained products of conception.  Hb/Hct 9.8/29.5        OB History    Para Term  AB Living   4 3 3 0 0 3   SAB IAB Ectopic Multiple Live Births   0 0 0 0 3      # Outcome Date GA Lbr John/2nd Weight Sex Type Anes PTL Lv   4 Current            3 Term 22 38w3d  3.374 kg (7 lb 7 oz) M Vag-Spont None N CROW      Name: DAVID FARIAS,JAIRO KRAMER      Apgar1: 8  Apgar5: 9   2 Term      Vag-Spont   CROW   1 Term      Vag-Spont   CROW     History reviewed. No pertinent past medical history.  History reviewed. No pertinent surgical history.    (Not in a hospital admission)      Review of patient's allergies indicates:  No Known Allergies     Family History    None       Tobacco Use    Smoking status: Never    Smokeless tobacco: Never   Substance and Sexual Activity    Alcohol use: Not Currently    Drug use: Not Currently    Sexual activity: Yes     Partners: Male     Review of Systems   Constitutional:  Positive for fatigue. Negative for fever and unexpected weight change.   Respiratory:  Positive for shortness of breath.    Cardiovascular:  Positive for palpitations. Negative for chest pain.   Gastrointestinal:  Negative for  abdominal pain, constipation, diarrhea, nausea and vomiting.   Genitourinary:  Positive for pelvic pain (mild cramping) and vaginal bleeding (heavy, large clots). Negative for dysuria, frequency, urgency, vaginal discharge, vaginal pain, postcoital bleeding and vaginal odor.   Neurological:  Positive for syncope.      Objective:     Vital Signs (Most Recent):  Temp: 98.3 °F (36.8 °C) (06/12/25 2120)  Pulse: 107 (06/12/25 2345)  Resp: 16 (06/12/25 2345)  BP: (!) 103/54 (06/12/25 2345)  SpO2: 100 % (06/12/25 2345) Vital Signs (24h Range):  Temp:  [98.3 °F (36.8 °C)] 98.3 °F (36.8 °C)  Pulse:  [] 107  Resp:  [12-33] 16  SpO2:  [98 %-100 %] 100 %  BP: ()/(38-71) 103/54     Weight: 74.9 kg (165 lb 2 oz)  Body mass index is 32.25 kg/m².         Physical Exam:   Constitutional: She is oriented to person, place, and time. She appears well-developed and well-nourished. No distress.             Abdominal: Soft. She exhibits no distension and no mass. There is no abdominal tenderness. There is no rebound and no guarding. Hernia confirmed negative in the right inguinal area and confirmed negative in the left inguinal area.     Genitourinary:    Pelvic exam was performed with patient supine.   There is no rash, tenderness, lesion or injury on the right labia. There is no rash, tenderness, lesion or injury on the left labia. Right adnexum displays no mass, no tenderness and no fullness. Left adnexum displays no mass, no tenderness and no fullness. There is bleeding (heavy bleeding with blood pooling in the diaper she's wearing) in the vagina. No erythema, vaginal discharge, tenderness, rectocele, cystocele or prolapse of vaginal walls in the vagina.    No foreign body in the vagina.      No signs of injury in the vagina.   Cervix exhibits no motion tenderness, no discharge and no friability. Uterus is enlarged. Uterus is not deviated, not fixed and not tender. Uterus size: 10 cm.   Genitourinary Comments: Cervix about  1cm dilated with clot palpated at the os                 Neurological: She is alert and oriented to person, place, and time.    Skin: There is pallor.    Psychiatric: She has a normal mood and affect.             Significant Labs:  Lab Results   Component Value Date    GROUPTRH O POS 06/12/2025    HEPBSAG Negative 07/21/2022    STREPBCULT No Group B Streptococcus isolated 10/26/2022       Recent Lab Results         06/12/25  2227   06/12/25  2155        Albumin   3.6       ALP   78       ALT   8       Anion Gap   12       Appearance, UA Clear         PTT   24.2  Comment: Refer to local heparin nomogram for intensity/dose specific therapeutic range.       AST   5       Baso #   0.04       Basophil %   0.3       Bilirubin (UA) Negative         BILIRUBIN TOTAL   0.4  Comment: For infants and newborns, interpretation of results should be based   on gestational age, weight and in agreement with clinical   observations.    Premature Infant recommended reference ranges:   0-24 hours:  <8.0 mg/dL   24-48 hours: <12.0 mg/dL   3-5 days:    <15.0 mg/dL   6-29 days:   <15.0 mg/dL       BUN   7       Calcium   9.0       Chloride   104       CO2   16       Color, UA Yellow         Creatinine   0.7       eGFR   >60  Comment: Estimated GFR calculated using the CKD-EPI creatinine (2021) equation.       Eos #   0.01       Eos %   0.1       Glucose   128       Glucose, UA Negative         Gran # (ANC)   10.97       Group & Rh   O POS       Hematocrit   29.5       Hemoglobin   9.8       HIV 1/2 Ag/Ab   Negative       Immature Grans (Abs)   0.04  Comment: Mild elevation in immature granulocytes is non specific and can be seen in a variety of conditions including stress response, acute inflammation, trauma and pregnancy. Correlation with other laboratory and clinical findings is essential.       Immature Granulocytes   0.3       INDIRECT AMBERLY   NEG       INR   1.0  Comment: Coumadin Therapy:    2.0 - 3.0 for INR for all indicators  except mechanical heart valves    and antiphospholipid syndromes which should use 2.5 - 3.5.       Ketones, UA Negative         Leukocyte Esterase, UA Negative         Lymph #   1.10       Lymph %   8.8       MCH   27.0       MCHC   33.2       MCV   81       Mono #   0.33       Mono %   2.6       MPV   10.3       Neut %   87.9       NITRITE UA Negative         nRBC   0       Blood, UA Negative         pH, UA 7.0         Platelet Count   295       Potassium   3.7       hCG Qualitative, Urine Positive         PROTEIN TOTAL   7.5       Protein, UA Trace  Comment: Recommend a 24 hour urine protein or a urine protein/creatinine ratio if globulin induced proteinuria is clinically suspected.         PT   11.3       RBC   3.63       RDW   13.7       Sodium   132       Spec Grav UA 1.025         Specimen Outdate   06/15/2025 23:59       Troponin I   <0.006  Comment: The reference interval for Troponin I represents the 99th percentile cutoff for our facility and is consistent with 3rd generation assay performance.       Urobilinogen, UA Negative         WBC   12.49             Assessment/Plan:     24 y.o. female  at Unknown for:    * Incomplete   Discussed recommendation for D&C to expedite evacuation of the pregnancy and stop her bleeding.  Pt and her brother expressed understanding.   The risks, benefits, and alternatives of the procedure were reviewed with the patient.  Surgical consents were reviewed in detail and were signed.  All questions were answered. Call out OR team for suction D&C  -place in observation afterwards to trend Hb/Hct and monitor for symptoms of anemia      Symptomatic anemia  Hb/Hct 9.8/29.5 on admit  Pt hypotensive, tachycardic, and syncopal  Give IV crystalloid and match 2U PRBC's (will transfuse if no improvement in vital signs)  Trend Hb/Hct after D&C        Adriana Ly MD  Obstetrics  [facility] - Emergency Dept.

## 2025-06-13 NOTE — PLAN OF CARE
#  used via [email or web address] to discuss POC with the patient. Vaginal bleeding, cramping, s/s of low B/P, IV fluids, ambulating discussed with the patient this AM.  # used via [email or web address] to discuss discharge information. Patient denied questions. IV removed. Patient brought to her vehicle via wheelchair. No s/s of  distress noted.

## 2025-06-13 NOTE — PLAN OF CARE
Patient afebrile and had no falls this shift. Has a moderate amount of vaginal bleeding from DNC , denies abdominal pain. Voids spontaneously. Ambulates independently. Pain well controlled with oral pain medication. Vital signs stable at this time. Call light in reach of patient, educated to use if needed.

## 2025-06-13 NOTE — ED PROVIDER NOTES
SCRIBE #1 NOTE: I, Preet Deedee, am scribing for, and in the presence of, Raul Valencia Jr., MD. I have scribed the entire note.       History     Chief Complaint   Patient presents with    Loss of Consciousness     Reports two episodes of syncope since starting cycle this morning. Reports she is saturating a menstrual pad every 5 minutes. Brother reports she is more pale than normal.      Review of patient's allergies indicates:  No Known Allergies      History of Present Illness     HPI    6/12/2025, 9:42 PM  History obtained from the patient and friend who also helped with Citizen of [place]-English interpretation      History of Present Illness: Arabella Farias is a 24 y.o. female patient who presents to the Emergency Department for evaluation of 2 x syncopal episodes which occurred this morning. Patient reports having vaginal bleeding that requires her to change her pads every 5 minutes. Symptoms are constant and moderate in severity. No mitigating or exacerbating factors reported. Associated sxs include pallor per her friend. Patient denies any CP, prolonged heat exposure, and all other sxs at this time. No further complaints or concerns at this time.       Arrival mode: Personal vehicle    PCP: No, Primary Doctor        Past Medical History:  History reviewed. No pertinent past medical history.    Past Surgical History:  History reviewed. No pertinent surgical history.      Family History:  No family history on file.    Social History:  Social History     Tobacco Use    Smoking status: Never    Smokeless tobacco: Never   Substance and Sexual Activity    Alcohol use: Not Currently    Drug use: Not Currently    Sexual activity: Yes     Partners: Male        Review of Systems     Review of Systems   Constitutional:  Negative for fever.   HENT:  Negative for sore throat.    Respiratory:  Negative for shortness of breath.    Cardiovascular:  Negative for chest pain.   Gastrointestinal:  Negative for blood in  stool, nausea and vomiting.   Genitourinary:  Positive for vaginal bleeding. Negative for dysuria.   Musculoskeletal:  Negative for back pain.   Skin:  Positive for pallor. Negative for rash.   Neurological:  Positive for syncope. Negative for weakness.   Hematological:  Does not bruise/bleed easily.   All other systems reviewed and are negative.       Physical Exam     Initial Vitals [06/12/25 2120]   BP Pulse Resp Temp SpO2   110/71 (!) 146 20 98.3 °F (36.8 °C) 98 %      MAP       --          Physical Exam  Nursing Notes and Vital Signs Reviewed.  Constitutional: Patient is in no acute distress. Well-developed and well-nourished.  Head: Atraumatic. Normocephalic.  Eyes:  EOM intact.  No scleral icterus.  ENT: Mucous membranes are moist.  Nares clear   Neck:  Full ROM. No JVD.  Cardiovascular: Regular rate. Regular rhythm No murmurs, rubs, or gallops. Distal pulses are 2+ and symmetric  Pulmonary/Chest: No respiratory distress. Clear to auscultation bilaterally. No wheezing or rales.  Equal chest wall rise bilaterally  Abdominal: Soft and non-distended.  There is no tenderness.  No rebound, guarding, or rigidity. Good bowel sounds.  Genitourinary: No CVA tenderness.  No suprapubic tenderness.  Patient passing large clots  Musculoskeletal: Moves all extremities. No obvious deformities.  5 x 5 strength in all extremities   Skin: Warm and dry.  Neurological:  Alert, awake, and appropriate.  Normal speech.  No acute focal neurological deficits are appreciated.  Two through 12 intact bilaterally.  Psychiatric: Normal affect. Good eye contact. Appropriate in content.     ED Course   Critical Care    Date/Time: 6/13/2025 12:11 AM    Performed by: Raul Valencia Jr., MD  Authorized by: Raul Valencia Jr., MD  Direct patient critical care time: 12 minutes  Additional history critical care time: 5 minutes  Ordering / reviewing critical care time: 7 minutes  Documentation critical care time: 10 minutes  Consulting other  physicians critical care time: 4 minutes  Consult with family critical care time: 5 minutes  Total critical care time (exclusive of procedural time) : 43 minutes  Critical care time was exclusive of separately billable procedures and treating other patients and teaching time.  Critical care was necessary to treat or prevent imminent or life-threatening deterioration of the following conditions: circulatory failure.  Critical care was time spent personally by me on the following activities: development of treatment plan with patient or surrogate, discussions with consultants, interpretation of cardiac output measurements, evaluation of patient's response to treatment, examination of patient, obtaining history from patient or surrogate, ordering and performing treatments and interventions, ordering and review of laboratory studies, ordering and review of radiographic studies, pulse oximetry, review of old charts and re-evaluation of patient's condition.        ED Vital Signs:  Vitals:    06/12/25 2120 06/12/25 2147 06/12/25 2156 06/12/25 2213   BP: 110/71 94/61  (!) 74/38   Pulse: (!) 146 (!) 116 (!) 119 (!) 119   Resp: 20 19  (!) 26   Temp: 98.3 °F (36.8 °C)      TempSrc: Axillary      SpO2: 98% 98%     Weight:        06/12/25 2214 06/12/25 2225 06/12/25 2229 06/12/25 2233   BP: (!) 91/50  (!) 88/51 (!) 99/57   Pulse: (!) 119  105 107   Resp: 12  (!) 27 (!) 27   Temp:       TempSrc:       SpO2:   99% 99%   Weight:  74.9 kg (165 lb 2 oz)      06/12/25 2300 06/12/25 2302 06/12/25 2315 06/12/25 2330   BP: (!) 85/49 (!) 88/54 93/62 95/66   Pulse: 101 97 103 105   Resp: (!) 25 (!) 22 (!) 33 (!) 21   Temp:       TempSrc:       SpO2: 100% 100% 100% 100%   Weight:        06/12/25 2345 06/13/25 0008   BP: (!) 103/54 (!) 108/56   Pulse: 107 (!) 120   Resp: 16 17   Temp:     TempSrc:     SpO2: 100% 100%   Weight:         Abnormal Lab Results:  Labs Reviewed   HEPATITIS C ANTIBODY - Abnormal       Result Value    Hep C Ab Interp  Positive (*)    CBC WITH DIFFERENTIAL - Abnormal    WBC 12.49      RBC 3.63 (*)     HGB 9.8 (*)     HCT 29.5 (*)     MCV 81 (*)     MCH 27.0      MCHC 33.2      RDW 13.7      Platelet Count 295      MPV 10.3      Nucleated RBC 0      Neut % 87.9 (*)     Lymph % 8.8 (*)     Mono % 2.6 (*)     Eos % 0.1      Basophil % 0.3      Imm Grans % 0.3      Neut # 10.97 (*)     Lymph # 1.10      Mono # 0.33      Eos # 0.01      Baso # 0.04      Imm Grans # 0.04     COMPREHENSIVE METABOLIC PANEL - Abnormal    Sodium 132 (*)     Potassium 3.7      Chloride 104      CO2 16 (*)     Glucose 128 (*)     BUN 7      Creatinine 0.7      Calcium 9.0      Protein Total 7.5      Albumin 3.6      Bilirubin Total 0.4      ALP 78      AST 5 (*)     ALT 8 (*)     Anion Gap 12      eGFR >60     PREGNANCY TEST, URINE RAPID - Abnormal    hCG Qualitative, Urine Positive (*)    URINALYSIS, REFLEX TO URINE CULTURE - Abnormal    Color, UA Yellow      Appearance, UA Clear      pH, UA 7.0      Spec Grav UA 1.025      Protein, UA Trace (*)     Glucose, UA Negative      Ketones, UA Negative      Bilirubin, UA Negative      Blood, UA Negative      Nitrites, UA Negative      Urobilinogen, UA Negative      Leukocyte Esterase, UA Negative     HIV 1 / 2 ANTIBODY - Normal    HIV 1/2 Ag/Ab Negative     APTT - Normal    PTT 24.2     TROPONIN I - Normal    Troponin-I <0.006     PROTIME-INR - Normal    PT 11.3      INR 1.0     CBC W/ AUTO DIFFERENTIAL    Narrative:     The following orders were created for panel order CBC auto differential.  Procedure                               Abnormality         Status                     ---------                               -----------         ------                     CBC with Differential[[id number]]        Abnormal            Final result                 Please view results for these tests on the individual orders.   HEP C VIRUS HOLD SPECIMEN    Extra Tube Hold for add-ons.     EXTRA TUBES    Narrative:     The  following orders were created for panel order EXTRA TUBES.  Procedure                               Abnormality         Status                     ---------                               -----------         ------                     Lavender Top Hold[[id number]]                                Final result                 Please view results for these tests on the individual orders.   LAVENDER TOP HOLD    Extra Tube Hold for add-ons.     GREY TOP URINE HOLD    Extra Tube Hold for add-ons.     HEPATITIS C RNA, QUANTITATIVE, PCR   TYPE & SCREEN    Specimen Outdate 06/15/2025 23:59      Group & Rh O POS      Indirect Eleanor NEG     PREPARE RBC SOFT        All Lab Results:  Results for orders placed or performed during the hospital encounter of 06/12/25   Hepatitis C Antibody    Collection Time: 06/12/25  9:55 PM   Result Value Ref Range    Hep C Ab Interp Positive (A) Negative   HCV Virus Hold Specimen    Collection Time: 06/12/25  9:55 PM   Result Value Ref Range    Extra Tube Hold for add-ons.    HIV 1/2 Ag/Ab (4th Gen)    Collection Time: 06/12/25  9:55 PM   Result Value Ref Range    HIV 1/2 Ag/Ab Negative Negative   CBC with Differential    Collection Time: 06/12/25  9:55 PM   Result Value Ref Range    WBC 12.49 3.90 - 12.70 K/uL    RBC 3.63 (L) 4.00 - 5.40 M/uL    HGB 9.8 (L) 12.0 - 16.0 gm/dL    HCT 29.5 (L) 37.0 - 48.5 %    MCV 81 (L) 82 - 98 fL    MCH 27.0 27.0 - 31.0 pg    MCHC 33.2 32.0 - 36.0 g/dL    RDW 13.7 11.5 - 14.5 %    Platelet Count 295 150 - 450 K/uL    MPV 10.3 9.2 - 12.9 fL    Nucleated RBC 0 <=0 /100 WBC    Neut % 87.9 (H) 38 - 73 %    Lymph % 8.8 (L) 18 - 48 %    Mono % 2.6 (L) 4 - 15 %    Eos % 0.1 <=8 %    Basophil % 0.3 <=1.9 %    Imm Grans % 0.3 0.0 - 0.5 %    Neut # 10.97 (H) 1.8 - 7.7 K/uL    Lymph # 1.10 1 - 4.8 K/uL    Mono # 0.33 0.3 - 1 K/uL    Eos # 0.01 <=0.5 K/uL    Baso # 0.04 <=0.2 K/uL    Imm Grans # 0.04 0.00 - 0.04 K/uL   APTT    Collection Time: 06/12/25  9:55 PM   Result Value  Ref Range    PTT 24.2 21.0 - 32.0 seconds   Troponin I    Collection Time: 06/12/25  9:55 PM   Result Value Ref Range    Troponin-I <0.006 <=0.026 ng/mL   Comprehensive metabolic panel    Collection Time: 06/12/25  9:55 PM   Result Value Ref Range    Sodium 132 (L) 136 - 145 mmol/L    Potassium 3.7 3.5 - 5.1 mmol/L    Chloride 104 95 - 110 mmol/L    CO2 16 (L) 23 - 29 mmol/L    Glucose 128 (H) 70 - 110 mg/dL    BUN 7 6 - 20 mg/dL    Creatinine 0.7 0.5 - 1.4 mg/dL    Calcium 9.0 8.7 - 10.5 mg/dL    Protein Total 7.5 6.0 - 8.4 gm/dL    Albumin 3.6 3.5 - 5.2 g/dL    Bilirubin Total 0.4 0.1 - 1.0 mg/dL    ALP 78 40 - 150 unit/L    AST 5 (L) 11 - 45 unit/L    ALT 8 (L) 10 - 44 unit/L    Anion Gap 12 8 - 16 mmol/L    eGFR >60 >60 mL/min/1.73/m2   Protime-INR    Collection Time: 06/12/25  9:55 PM   Result Value Ref Range    PT 11.3 9.0 - 12.5 seconds    INR 1.0 0.8 - 1.2   Type & Screen    Collection Time: 06/12/25  9:55 PM   Result Value Ref Range    Specimen Outdate 06/15/2025 23:59     Group & Rh O POS     Indirect Eleanor NEG    Lavender Top Hold    Collection Time: 06/12/25 10:09 PM   Result Value Ref Range    Extra Tube Hold for add-ons.    Pregnancy, urine rapid    Collection Time: 06/12/25 10:27 PM   Result Value Ref Range    hCG Qualitative, Urine Positive (A) Negative   Urinalysis, Reflex to Urine Culture Urine, Clean Catch    Collection Time: 06/12/25 10:27 PM    Specimen: Urine, Catheterized   Result Value Ref Range    Color, UA Yellow Straw, Serena, Yellow, Light-Orange    Appearance, UA Clear Clear    pH, UA 7.0 5.0 - 8.0    Spec Grav UA 1.025 1.005 - 1.030    Protein, UA Trace (A) Negative    Glucose, UA Negative Negative    Ketones, UA Negative Negative    Bilirubin, UA Negative Negative    Blood, UA Negative Negative    Nitrites, UA Negative Negative    Urobilinogen, UA Negative <2.0 EU/dL    Leukocyte Esterase, UA Negative Negative   GREY TOP URINE HOLD    Collection Time: 06/12/25 10:27 PM   Result Value  Ref Range    Extra Tube Hold for add-ons.          Imaging Results:  Imaging Results              US OB <14 Wks, TransAbd, Single Gestation (In process)                      The EKG was ordered, reviewed, and independently interpreted by the ED provider.  Interpretation time: 21:23  Rate: 138 BPM  Rhythm: sinus tachycardia  Interpretation: ST and T wave abnormality, consider inferior ischemia. No STEMI.    The Emergency Provider reviewed the vital signs and test results, which are outlined above.     ED Discussion           ED Course as of 06/13/25 0013   Thu Jun 12, 2025 2227 Cardiac monitor interpretation  Independent interpretation by ED MD  Indication:  Syncope  Sinus tachycardia.  Rate 111.  No STEMI [RT]      ED Course User Index  [RT] Raul Valencia Jr., MD     Medical Decision Making  Differential diagnosis: Dysfunctional uterine bleeding, menstrual cycle, anemia, dehydration, threatened miscarriage    The patient is evaluated history physical examination.  Patient is passing large clots with a tachycardic receiving fluids on arrival with mild hypotension that was improving with fluids.  The patient is passing large clots in the uterus TXA was given.  UPT returned positive and ultrasound performed showing what appears to be a missed ab.  Hemoglobin was 9.8.  Ob was consulted.  Methergine and Cytotec given.  Patient has been brought to the OR for the N/C    Amount and/or Complexity of Data Reviewed  Independent Historian: friend     Details: Friend at bedside provided additional history  Labs: ordered. Decision-making details documented in ED Course.     Details: UPT positive UA negative.  Have Rh positive.  Troponin undetectable.  CMP is benign INR normal.  PTT normal.  Hemoglobin is 9.8.  Radiology: ordered.     Details: Missed ab  ECG/medicine tests: ordered and independent interpretation performed. Decision-making details documented in ED Course.     Details: No STEMI  Discussion of management or  test interpretation with external provider(s):     11:25 PM: Discussed pt's case with Dr. Ly (OB/GYN) who recommends 400 mcg of PO cytotec and if no hx of HTN, can also give methergine 0.2 mg IM. She will come to evaluate patient at bedside. She states patient may need a D&C to resolve the bleeding with plans to trend CBC afterwards.    11:32 PM: Discussed case with Dr. Ly who agrees with current care and management of pt and accepts admission.   Admitting Service: OB/GYN  Admitting Physician: Dr. Ly  Admit to: Obs    11:32 PM: Re-evaluated pt. I have discussed test results, shared treatment plan, and the need for admission with patient and family at bedside. Pt and family express understanding at this time and agree with all information. All questions answered. Pt and family have no further questions or concerns at this time. Pt is ready for admit.    Risk  OTC drugs.  Prescription drug management.  Decision regarding hospitalization.  Emergency major surgery.                 ED Medication(s):  Medications   miSOPROStoL tablet 100 mcg (has no administration in time range)   methylergonovine injection 200 mcg (has no administration in time range)   0.9%  NaCl infusion (for blood administration) (has no administration in time range)   sodium chloride 0.9% bolus 1,000 mL 1,000 mL (0 mLs Intravenous Stopped 6/12/25 2028)   tranexamic acid in NaCl,iso-os IVPB 1,000 mg (0 mg Intravenous Stopped 6/12/25 0194)       New Prescriptions    No medications on file               Scribe Attestation:   Scribe #1: I performed the above scribed service and the documentation accurately describes the services I performed. I attest to the accuracy of the note.     Attending:   Physician Attestation Statement for Scribe #1: I, Raul Valencia Jr., MD, personally performed the services described in this documentation, as scribed by Preet Mark, in my presence, and it is both accurate and complete.           Clinical  Impression       ICD-10-CM ICD-9-CM   1. Anemia, unspecified type  D64.9 285.9   2. Tachycardia  R00.0 785.0   3. Syncope  R55 780.2   4. Miscarriage  O03.9 634.90   5. Incomplete   O03.4 637.91       Disposition:   Disposition: Placed in Observation  Condition: Raul Caicedo Jr., MD  25 0014

## 2025-06-13 NOTE — HPI
25 y/o  presented to the ER with her brother for heavy vaginal bleeding and 2 episodes of loss of consciousness at home.  Pt started having very heavy bleeding this morning, passing large clots.  She then became very weak and lightheaded.  She lost consciousness twice at home, so presented to the ED where she had another syncopal episode.  UPT in the ED is positive.  Pt was unaware she is pregnant.  Ultrasound with heterogenous material within the endometrium with flow on doppler.  Findings concerning for retained products of conception.  Hb/Hct 9.8/29.5

## 2025-06-13 NOTE — ASSESSMENT & PLAN NOTE
Discussed recommendation for D&C to expedite evacuation of the pregnancy and stop her bleeding.  Pt and her brother expressed understanding.   The risks, benefits, and alternatives of the procedure were reviewed with the patient.  Surgical consents were reviewed in detail and were signed.  All questions were answered. Call out OR team for suction D&C  -place in observation afterwards to trend Hb/Hct and monitor for symptoms of anemia

## 2025-06-13 NOTE — ANESTHESIA PREPROCEDURE EVALUATION
2025  Arabella Farias is a 24 y.o., female.      Pre-op Assessment    I have reviewed the Patient Summary Reports.     I have reviewed the Nursing Notes. I have reviewed the NPO Status.      Review of Systems  Anesthesia Hx:  No problems with previous Anesthesia                Hematology/Oncology:  Hematology Normal   Oncology Normal                                   Renal/:  Renal/ Normal                 Hepatic/GI:  Hepatic/GI Normal                    Neurological:  Neurology Normal                                      Endocrine:  Endocrine Normal            Dermatological:  Skin Normal    Psych:  Psychiatric Normal                    Physical Exam  General: Well nourished, Cooperative, Alert and Oriented    Airway:  Mallampati: II / II  Mouth Opening: Normal  TM Distance: Normal  Tongue: Normal  Neck ROM: Normal ROM    Dental:  Intact      Patient Active Problem List   Diagnosis    Hepatitis C antibody positive in blood    Uses Yoruba as primary spoken language    Symptomatic anemia    Single live birth    Incomplete          Anesthesia Plan  Type of Anesthesia, risks & benefits discussed:    Anesthesia Type: Gen ETT, Gen Supraglottic Airway, MAC  Intra-op Monitoring Plan: Standard ASA Monitors  Post Op Pain Control Plan: multimodal analgesia  Induction:  IV  Airway Plan: Direct  Informed Consent: Informed consent signed with the Patient and all parties understand the risks and agree with anesthesia plan.  All questions answered.   ASA Score: 2 Emergent  Day of Surgery Review of History & Physical: H&P Update referred to the surgeon/provider.I have interviewed and examined the patient. I have reviewed the patient's H&P dated: There are no significant changes. H&P completed by Anesthesiologist.    Ready For Surgery From Anesthesia Perspective.     .

## 2025-06-13 NOTE — ANESTHESIA PROCEDURE NOTES
Intubation    Date/Time: 6/13/2025 1:09 AM    Performed by: Andre Gamino CRNA  Authorized by: Ian Craig MD    Intubation:     Induction:  Intravenous    Intubated:  Postinduction    Mask Ventilation:  Not attempted    Attempts:  1    Attempted By:  CRNA    Difficult Airway Encountered?: No      Complications:  None    Airway Device:  Supraglottic airway/LMA    Airway Device Size:  3.0    Style/Cuff Inflation:  Cuffed (inflated to minimal occlusive pressure)    Secured at:  The lips    Placement Verified By:  Capnometry    Complicating Factors:  None    Findings Post-Intubation:  BS equal bilateral and atraumatic/condition of teeth unchanged

## 2025-06-13 NOTE — ASSESSMENT & PLAN NOTE
Hb/Hct 9.8/29.5 on admit  Pt hypotensive, tachycardic, and syncopal  Give IV crystalloid and match 2U PRBC's (will transfuse if no improvement in vital signs)  Trend Hb/Hct after D&C

## 2025-06-13 NOTE — TRANSFER OF CARE
Anesthesia Transfer of Care Note    Patient: Arabella Farias    Procedure(s) Performed: Procedure(s) (LRB):  DILATION AND CURETTAGE, UTERUS, USING SUCTION (N/A)    Patient location: PACU    Anesthesia Type: general    Transport from OR: Transported from OR on room air with adequate spontaneous ventilation    Post pain: adequate analgesia    Post assessment: no apparent anesthetic complications and tolerated procedure well    Post vital signs: stable    Level of consciousness: awake    Nausea/Vomiting: no nausea/vomiting    Complications: none    Transfer of care protocol was followed      Last vitals: Visit Vitals  BP (!) 106/59   Pulse (!) 123   Temp 36.8 °C (98.3 °F) (Axillary)   Resp (!) 35   Wt 74.9 kg (165 lb 2 oz)   LMP 06/12/2025 (Exact Date)   SpO2 100%   BMI 32.25 kg/m²

## 2025-06-13 NOTE — OP NOTE
Operative Note       SURGERY DATE:  6/13/2025     PRE-OP DIAGNOSIS:  Miscarriage [O03.9]    POST-OP DIAGNOSIS:  Miscarriage [O03.9]    Procedure(s) (LRB):  DILATION AND CURETTAGE, UTERUS, USING SUCTION (N/A)    Surgeons and Role:     * Adriana Ly MD - Primary    ASSISTANT:  None    TASKS PERFORMED BY ASSISTANT:  No assistant    ANESTHESIA: General/MAC    FINDINGS: Uterus 10 week size, cervix 1.5cm dilated with large amount of products of conception present with some extruding from the external cervical os.  Endometrial cavity with gritty texture throughout at the end of the case.    GRAFTS/IMPLANTS:  None    ESTIMATED BLOOD LOSS: 100 mL              COMPLICATIONS:  None    SPECIMEN:  products of conception    DESCRIPTION OF PROCEDURE:    The patient was taken to the Operating Room where general        anesthesia with LMA was induced and found to be adequate.  Pre-op antibiotics were given.     Her perineum was then prepped and draped in normal sterile fashion.  Time out was performed.                                                                     A weighted sterile speculum was     then placed in the patient's vagina, and the anterior lip of the cervix      was grasped with single-tooth tenaculum.  The uterine cervix was already 1.5cm dilated with some products of conception extruding through the cervix.  This was teased out with ringed forceps.  A 10mm suction curette was     then advanced into the uterus and suction was applied.  Several passes       were  made and products of conception were removed.  Sharp      curettage was then performed.    Repeated suction curettage was then performed until it felt        that  all products of conception were removed.  Sharp curettage was then           performed again, and a gritty texture was noted on all 4 walls of the uterus.       The patient had no bleeding at the end of the case.    The single-tooth tenaculum was removed from the anterior lip of the cervix  and excellent hemostasis was noted.  Repeat bimanual exam revealed the uterus to be about 8 week size and firm.   Sponge, lap and needle counts were correct  X 2.  She will go to Recovery in stable condition.               CONDITION: Stable    DISPOSITION: PACU - hemodynamically stable.

## 2025-06-13 NOTE — ED NOTES
Bed: 12  Expected date:   Expected time:   Means of arrival:   Comments:  TRIAGE    
Nurse and tech was doing and in&out catch and changing  patient brief , when changing brief  we noticed a blood clot.  was notified and came assess  
OB at bedside  
Pharmacy called for Cytotec. Will bring down to ED   
Report given to Surgery, RN. Blood consent obtained, placed on chart  
US at bedside   
no

## 2025-06-13 NOTE — BRIEF OP NOTE
[facility])  Brief Operative Note    SUMMARY     Surgery Date: 6/13/2025     Surgeons and Role:     * Adriana Ly MD - Primary    Assisting Surgeon: None    Pre-op Diagnosis:  Miscarriage [O03.9]    Post-op Diagnosis:  Post-Op Diagnosis Codes:     * Miscarriage [O03.9]    Procedure(s) (LRB):  DILATION AND CURETTAGE, UTERUS, USING SUCTION (N/A)    Anesthesia: General/MAC    Implants:  * No implants in log *    Operative Findings: Uterus 10 week size, cervix 1.5cm dilated with large amount of products of conception present with some extruding from the external cervical os.  Endometrial cavity with gritty texture throughout at the end of the case.    Estimated Blood Loss: 100 mL    Estimated Blood Loss has been documented.         Specimens:   Products of conception    [id number]

## 2025-06-14 LAB
OHS QRS DURATION: 68 MS
OHS QTC CALCULATION: 424 MS

## 2025-06-14 NOTE — DISCHARGE SUMMARY
[facility] ([facility])  Obstetrics  Discharge Summary      Patient Name: Arabella Farias  MRN: [id number]  Admission Date: 2025  Hospital Length of Stay: 0 days  Discharge Date and Time: 2025  4:01 PM  Attending Physician: Keyonna att. providers found   Discharging Provider: Erin Wang MD   Primary Care Provider: Keyonna, Primary Doctor    HPI: 25 y/o  presented to the ER with her brother for heavy vaginal bleeding and 2 episodes of loss of consciousness at home.  Pt started having very heavy bleeding this morning, passing large clots.  She then became very weak and lightheaded.  She lost consciousness twice at home, so presented to the ED where she had another syncopal episode.  UPT in the ED is positive.  Pt was unaware she is pregnant.  Ultrasound with heterogenous material within the endometrium with flow on doppler.  Findings concerning for retained products of conception.  Hb/Hct 9.8/29.5    Procedure(s) (LRB):  DILATION AND CURETTAGE, UTERUS, USING SUCTION (N/A)     Hospital Course:   Patient underwent suction D&C without complication.  Normal post op course.  Stable for discharge to home on POD#1       Lab Results   Component Value Date    WBC 7.69 2025    HGB 7.2 (L) 2025    HCT 23.0 (L) 2025    MCV 86 2025     2025          Final Active Diagnoses:    Diagnosis Date Noted POA    PRINCIPAL PROBLEM:  Incomplete  [O03.4] 2025 Yes    Symptomatic anemia [D64.9] 2022 Yes      Problems Resolved During this Admission:      Immunizations       None            This patient has no babies on file.  Pending Diagnostic Studies:       Procedure Component Value Units Date/Time    Hepatitis C RNA, Quantitative, PCR [[id number]] Collected: 25    Order Status: Sent Lab Status: In process Updated: 25    Specimen: Blood             Discharged Condition: stable    Disposition: Home or Self Care    Follow Up:    Follow-up Information       Adriana Ly MD Follow up in 4 week(s).    Specialties: Obstetrics and Gynecology, Obstetrics  Why: Postop check  Contact information:  [address] [place] [address] DR Yissel [place] [address] [phone]                           Patient Instructions:      Diet Adult Regular     No driving until:   Order Comments: Two weeks post surgery.     Pelvic Rest   Order Comments: Pelvic rest X 6 weeks postpartum     Notify your health care provider if you experience any of the following:  temperature >100.4     Notify your health care provider if you experience any of the following:  severe uncontrolled pain     Notify your health care provider if you experience any of the following:  difficulty breathing or increased cough     Notify your health care provider if you experience any of the following:  persistent dizziness, light-headedness, or visual disturbances     Notify your health care provider if you experience any of the following:  increased confusion or weakness     Activity as tolerated     Medications:  Discharge Medication List as of 6/13/2025  3:14 PM        STOP taking these medications       ibuprofen (ADVIL,MOTRIN) 600 MG tablet Comments:   Reason for Stopping:               Erin Wang MD  Obstetrics  [facility])

## 2025-06-14 NOTE — HOSPITAL COURSE
Patient underwent suction D&C without complication.  Normal post op course.  Stable for discharge to home on POD#1

## 2025-06-16 LAB
ABO + RH BLD: NORMAL
ABO + RH BLD: NORMAL
BLD PROD TYP BPU: NORMAL
BLD PROD TYP BPU: NORMAL
BLOOD UNIT EXPIRATION DATE: NORMAL
BLOOD UNIT EXPIRATION DATE: NORMAL
BLOOD UNIT TYPE CODE: 5100
BLOOD UNIT TYPE CODE: 5100
CROSSMATCH INTERPRETATION: NORMAL
CROSSMATCH INTERPRETATION: NORMAL
DISPENSE STATUS: NORMAL
DISPENSE STATUS: NORMAL
ESTROGEN SERPL-MCNC: NORMAL PG/ML
HCV RNA SERPL NAA+PROBE-LOG IU: NOT DETECTED {LOG_IU}/ML
INSULIN SERPL-ACNC: NORMAL U[IU]/ML
LAB AP CLINICAL INFORMATION: NORMAL
LAB AP DIAGNOSIS CATEGORY: NORMAL
LAB AP GROSS DESCRIPTION: NORMAL
LAB AP PERFORMING LOCATION(S): NORMAL
LAB AP REPORT FOOTNOTES: NORMAL
UNIT NUMBER: NORMAL
UNIT NUMBER: NORMAL

## 2025-06-23 NOTE — ANESTHESIA POSTPROCEDURE EVALUATION
Anesthesia Post Evaluation    Patient: Arabella Farias    Procedure(s) Performed: Procedure(s) (LRB):  DILATION AND CURETTAGE, UTERUS, USING SUCTION (N/A)    Final Anesthesia Type: general      Patient location during evaluation: PACU  Patient participation: Yes- Able to Participate  Level of consciousness: awake and alert, oriented and awake  Post-procedure vital signs: reviewed and stable  Pain management: adequate  Airway patency: patent    PONV status at discharge: No PONV  Anesthetic complications: no      Cardiovascular status: blood pressure returned to baseline  Respiratory status: unassisted  Hydration status: euvolemic  Follow-up not needed.              Vitals Value Taken Time   /55 06/13/25 14:00   Temp 37 °C (98.6 °F) 06/13/25 14:00   Pulse 86 06/13/25 14:00   Resp 19 06/13/25 14:00   SpO2 99 % 06/13/25 09:00         Event Time   Out of Recovery 06/13/2025 02:04:03         Pain/Emerson Score: No data recorded

## 2025-07-21 ENCOUNTER — PATIENT MESSAGE (OUTPATIENT)
Dept: RESEARCH | Facility: HOSPITAL | Age: 25
End: 2025-07-21

## (undated) DEVICE — VACURETTE 10MM CURVED

## (undated) DEVICE — GLOVE SENSICARE PI ALOE 6

## (undated) DEVICE — TRAY SKIN SCRUB WET 4 COMPART

## (undated) DEVICE — PACK SURG LITHOTOMY 3 SIRUS

## (undated) DEVICE — DRESSING TELFA N ADH 3X8

## (undated) DEVICE — DRAPE UINDERBUT GRAD PCH

## (undated) DEVICE — CONTAINER SPECIMEN OR STER 4OZ

## (undated) DEVICE — COVER LIGHT HANDLE 80/CA

## (undated) DEVICE — SET DISPOSABLE COLLECTION